# Patient Record
Sex: MALE | Race: WHITE | NOT HISPANIC OR LATINO | Employment: OTHER | URBAN - NONMETROPOLITAN AREA
[De-identification: names, ages, dates, MRNs, and addresses within clinical notes are randomized per-mention and may not be internally consistent; named-entity substitution may affect disease eponyms.]

---

## 2023-11-20 ENCOUNTER — OFFICE VISIT (OUTPATIENT)
Dept: FAMILY MEDICINE CLINIC | Facility: CLINIC | Age: 53
End: 2023-11-20
Payer: MEDICARE

## 2023-11-20 VITALS
SYSTOLIC BLOOD PRESSURE: 125 MMHG | HEART RATE: 93 BPM | WEIGHT: 173 LBS | BODY MASS INDEX: 25.62 KG/M2 | HEIGHT: 69 IN | OXYGEN SATURATION: 99 % | TEMPERATURE: 98 F | DIASTOLIC BLOOD PRESSURE: 85 MMHG

## 2023-11-20 DIAGNOSIS — Z23 NEED FOR INFLUENZA VACCINATION: ICD-10-CM

## 2023-11-20 DIAGNOSIS — Z12.2 ENCOUNTER FOR SCREENING FOR LUNG CANCER: ICD-10-CM

## 2023-11-20 DIAGNOSIS — Z11.59 NEED FOR HEPATITIS C SCREENING TEST: ICD-10-CM

## 2023-11-20 DIAGNOSIS — Z12.11 ENCOUNTER FOR SCREENING FOR MALIGNANT NEOPLASM OF COLON: ICD-10-CM

## 2023-11-20 DIAGNOSIS — R20.0 NUMBNESS OF LEFT FOOT: ICD-10-CM

## 2023-11-20 DIAGNOSIS — Z85.9 HISTORY OF CANCER: ICD-10-CM

## 2023-11-20 DIAGNOSIS — Z76.89 ESTABLISHING CARE WITH NEW DOCTOR, ENCOUNTER FOR: Primary | ICD-10-CM

## 2023-11-20 DIAGNOSIS — Z13.29 SCREENING FOR THYROID DISORDER: ICD-10-CM

## 2023-11-20 DIAGNOSIS — Z13.220 SCREENING, LIPID: ICD-10-CM

## 2023-11-20 DIAGNOSIS — F17.210 NICOTINE DEPENDENCE, CIGARETTES, UNCOMPLICATED: ICD-10-CM

## 2023-11-20 DIAGNOSIS — Z12.5 SCREENING FOR MALIGNANT NEOPLASM OF PROSTATE: ICD-10-CM

## 2023-11-20 NOTE — PROGRESS NOTES
"Chief Complaint   Patient presents with    Establish Care     Hasn't had a primary care provider in a long time. Wants a check up, flu shot, and blood pressure check.        Subjective     {Problem List  Visit Diagnosis   Encounters  Notes  Medications  Labs  Result Review Imaging  Media :23}     Kevin Reyes presents to [facility]    History of Present Illness    Past History:  Medical History: has a past medical history of Cancer and Hypertension.   Surgical History: has a past surgical history that includes Back surgery.   Family History: family history includes Cancer in his father; Diabetes in his maternal grandmother; Heart disease in his mother; Stroke in his maternal grandmother and maternal uncle.   Social History: reports that he has been smoking cigarettes. He started smoking about 35 years ago. He has a 35.00 pack-year smoking history. He has been exposed to tobacco smoke. He has never used smokeless tobacco. He reports that he does not drink alcohol and does not use drugs.  Allergies: Patient has no known allergies.  (Not in a hospital admission)       Social History     Socioeconomic History    Marital status:    Tobacco Use    Smoking status: Every Day     Packs/day: 1.00     Years: 35.00     Additional pack years: 0.00     Total pack years: 35.00     Types: Cigarettes     Start date: 1988     Passive exposure: Current    Smokeless tobacco: Never   Vaping Use    Vaping Use: Never used   Substance and Sexual Activity    Alcohol use: Never    Drug use: Never    Sexual activity: Defer       Health Maintenance Due   Topic Date Due    BMI FOLLOWUP  Never done    COLORECTAL CANCER SCREENING  Never done    HEPATITIS C SCREENING  Never done       Objective     Vital Signs:   /85 (BP Location: Left arm, Patient Position: Sitting)   Pulse 93   Temp 98 °F (36.7 °C) (Infrared)   Ht 174 cm (68.5\")   Wt 78.5 kg (173 lb)   SpO2 99%   BMI 25.92 " kg/m²       Physical Exam     Review of Systems     Result Review :{Labs  Result Review  Imaging  Med Tab  Media  Procedures :23}   {The following data was reviewed by (Optional):86984}  {Ambulatory Labs (Optional):27881}  {Data reviewed (Optional):10220:::1}          Assessment and Plan {CC Problem List  Visit Diagnosis   ROS  Review (Popup)  Health Maintenance  Quality  BestPractice  Medications  SmartSets  SnapShot Encounters  Media :23}   Diagnoses and all orders for this visit:    1. Establishing care with new doctor, encounter for (Primary)    2. Encounter for screening for malignant neoplasm of colon  -     Cologuard - Stool, Per Rectum; Future    3. Need for hepatitis C screening test  -     Hepatitis C Antibody; Future    4. Screening, lipid  -     Lipid panel; Future    5. Screening for thyroid disorder  -     TSH+Free T4; Future    6. History of cancer  -     CBC w AUTO Differential; Future  -     Comprehensive metabolic panel; Future    7. Need for influenza vaccination  -     Fluzone (or Fluarix & Flulaval for VFC) >6mos    8. Screening for malignant neoplasm of prostate  -     PSA SCREENING; Future    Discussed age appropriate preventative measures. Written information given. Patient verbalized understanding.  Preventative measures discussed: vaccines, wellness exams, routine screenings, routine lab work, mental health wellness, seatbelt safety, healthy diet.   {Time Spent (Optional):44321}    Pt thought to be clinically stable at this time.    Follow Up {Instructions Charge Capture  Follow-up Communications :23}  Return in about 3 months (around 2/20/2024), or if symptoms worsen or fail to improve.  Patient was given instructions and counseling regarding his condition or for health maintenance advice. Please see specific information pulled into the AVS if appropriate.

## 2023-11-21 NOTE — PROGRESS NOTES
"The ABCs of the Annual Wellness Visit  Subsequent Medicare Wellness Visit    Chief Complaint   Patient presents with    Establish Care     Hasn't had a primary care provider in a long time. Wants a check up, flu shot, and blood pressure check.       Subjective    History of Present Illness:  Kevin Reyes is a 53 y.o. male who presents for a Subsequent Medicare Wellness Visit.    The following portions of the patient's history were reviewed and   updated as appropriate: allergies, current medications, past family history, past medical history, past social history, past surgical history, and problem list.    Compared to one year ago, the patient feels his physical   health is the same.    Compared to one year ago, the patient feels his mental   health is the same.    Recent Hospitalizations:  He was not admitted to the hospital during the last year.       Current Medical Providers:  Patient Care Team:  Maria Luisa Schwartz APRN as PCP - General (Family Medicine)    No outpatient medications prior to visit.     No facility-administered medications prior to visit.       No opioid medication identified on active medication list. I have reviewed chart for other potential  high risk medication/s and harmful drug interactions in the elderly.        Aspirin is not on active medication list.  Aspirin use is not indicated based on review of current medical condition/s. Risk of harm outweighs potential benefits.  .    There is no problem list on file for this patient.    Advance Care Planning  Advance Directive is not on file.  ACP discussion was held with the patient during this visit. Patient does not have an advance directive, information provided.          Objective    Vitals:    11/20/23 1526   BP: 125/85   BP Location: Left arm   Patient Position: Sitting   Pulse: 93   Temp: 98 °F (36.7 °C)   TempSrc: Infrared   SpO2: 99%   Weight: 78.5 kg (173 lb)   Height: 174 cm (68.5\")     Estimated body mass index is 25.92 " "kg/m² as calculated from the following:    Height as of this encounter: 174 cm (68.5\").    Weight as of this encounter: 78.5 kg (173 lb).           Does the patient have evidence of cognitive impairment? No    Physical Exam  Vitals reviewed.   Constitutional:       Appearance: Normal appearance.   HENT:      Nose: Nose normal.      Mouth/Throat:      Mouth: Mucous membranes are moist.   Cardiovascular:      Rate and Rhythm: Normal rate and regular rhythm.      Pulses: Normal pulses.      Heart sounds: Normal heart sounds.   Pulmonary:      Effort: Pulmonary effort is normal.      Breath sounds: Normal breath sounds.   Skin:     General: Skin is warm and dry.   Neurological:      General: No focal deficit present.      Mental Status: He is alert and oriented to person, place, and time.   Psychiatric:         Mood and Affect: Mood normal.         Behavior: Behavior normal.                 HEALTH RISK ASSESSMENT    Smoking Status:  Social History     Tobacco Use   Smoking Status Every Day    Packs/day: 1.00    Years: 35.00    Additional pack years: 0.00    Total pack years: 35.00    Types: Cigarettes    Start date:     Passive exposure: Current   Smokeless Tobacco Never     Alcohol Consumption:  Social History     Substance and Sexual Activity   Alcohol Use Never     Fall Risk Screen:    STEADI Fall Risk Assessment has not been completed.    Depression Screenin/20/2023     3:25 PM   PHQ-2/PHQ-9 Depression Screening   Little Interest or Pleasure in Doing Things 0-->not at all   Feeling Down, Depressed or Hopeless 0-->not at all   PHQ-9: Brief Depression Severity Measure Score 0       Health Habits and Functional and Cognitive Screening:       No data to display                Age-appropriate Screening Schedule:  Refer to the list below for future screening recommendations based on patient's age, sex and/or medical conditions. Orders for these recommended tests are listed in the plan section. The patient " has been provided with a written plan.    Health Maintenance   Topic Date Due    COLORECTAL CANCER SCREENING  Never done    LUNG CANCER SCREENING  Never done    HEPATITIS C SCREENING  Never done    ANNUAL WELLNESS VISIT  Never done    COVID-19 Vaccine (1) 12/04/2023 (Originally 1970)    TDAP/TD VACCINES (1 - Tdap) 12/04/2023 (Originally 6/19/1989)    ZOSTER VACCINE (1 of 2) 12/04/2023 (Originally 6/19/2020)    BMI FOLLOWUP  11/20/2024    INFLUENZA VACCINE  Completed    Pneumococcal Vaccine 0-64  Discontinued              Assessment & Plan   CMS Preventative Services Quick Reference  Risk Factors Identified During Encounter  Fall Risk-High or Moderate: Discussed Fall Prevention in the home  Immunizations Discussed/Encouraged: Influenza, Pneumococcal 23, and COVID19  Tobacco Use/Dependance Risk (use dotphrase .tobaccocessation for documentation)  The above risks/problems have been discussed with the patient.  Follow up actions/plans if indicated are seen below in the Assessment/Plan Section.  Pertinent information has been shared with the patient in the After Visit Summary.    Diagnoses and all orders for this visit:    1. Establishing care with new doctor, encounter for (Primary)    2. Encounter for screening for malignant neoplasm of colon  -     Cologuard - Stool, Per Rectum; Future    3. Need for hepatitis C screening test  -     Hepatitis C Antibody; Future    4. Screening, lipid  -     Lipid panel; Future    5. Screening for thyroid disorder  -     TSH+Free T4; Future    6. History of cancer  -     CBC w AUTO Differential; Future  -     Comprehensive metabolic panel; Future    7. Need for influenza vaccination  -     Fluzone (or Fluarix & Flulaval for VFC) >6mos    8. Screening for malignant neoplasm of prostate  -     PSA SCREENING; Future    9. Encounter for screening for lung cancer  -      CT Chest Low Dose Cancer Screening WO; Future    10. Nicotine dependence, cigarettes, uncomplicated  -      CT  Chest Low Dose Cancer Screening WO; Future    11. 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 Wheelchair bound.        Follow Up:   Return in about 3 months (around 2/20/2024), or if symptoms worsen or fail to improve.     An After Visit Summary and PPPS were made available to the patient.

## 2023-11-27 ENCOUNTER — LAB (OUTPATIENT)
Dept: LAB | Facility: HOSPITAL | Age: 53
End: 2023-11-27
Payer: MEDICARE

## 2023-11-27 DIAGNOSIS — Z85.9 HISTORY OF CANCER: ICD-10-CM

## 2023-11-27 DIAGNOSIS — Z12.5 SCREENING FOR MALIGNANT NEOPLASM OF PROSTATE: ICD-10-CM

## 2023-11-27 DIAGNOSIS — Z11.59 NEED FOR HEPATITIS C SCREENING TEST: ICD-10-CM

## 2023-11-27 DIAGNOSIS — Z13.220 SCREENING, LIPID: ICD-10-CM

## 2023-11-27 DIAGNOSIS — Z13.29 SCREENING FOR THYROID DISORDER: ICD-10-CM

## 2023-11-27 LAB
ALBUMIN SERPL-MCNC: 4.5 G/DL (ref 3.5–5.2)
ALBUMIN/GLOB SERPL: 1.3 G/DL
ALP SERPL-CCNC: 128 U/L (ref 39–117)
ALT SERPL W P-5'-P-CCNC: 24 U/L (ref 1–41)
ANION GAP SERPL CALCULATED.3IONS-SCNC: 12.9 MMOL/L (ref 5–15)
AST SERPL-CCNC: 18 U/L (ref 1–40)
BASOPHILS # BLD AUTO: 0.14 10*3/MM3 (ref 0–0.2)
BASOPHILS NFR BLD AUTO: 1.1 % (ref 0–1.5)
BILIRUB SERPL-MCNC: 0.8 MG/DL (ref 0–1.2)
BUN SERPL-MCNC: 12 MG/DL (ref 6–20)
BUN/CREAT SERPL: 12.2 (ref 7–25)
CALCIUM SPEC-SCNC: 9.8 MG/DL (ref 8.6–10.5)
CHLORIDE SERPL-SCNC: 102 MMOL/L (ref 98–107)
CHOLEST SERPL-MCNC: 199 MG/DL (ref 0–200)
CO2 SERPL-SCNC: 25.1 MMOL/L (ref 22–29)
CREAT SERPL-MCNC: 0.98 MG/DL (ref 0.76–1.27)
DEPRECATED RDW RBC AUTO: 39 FL (ref 37–54)
EGFRCR SERPLBLD CKD-EPI 2021: 92.2 ML/MIN/1.73
EOSINOPHIL # BLD AUTO: 0.61 10*3/MM3 (ref 0–0.4)
EOSINOPHIL NFR BLD AUTO: 4.9 % (ref 0.3–6.2)
ERYTHROCYTE [DISTWIDTH] IN BLOOD BY AUTOMATED COUNT: 12.7 % (ref 12.3–15.4)
GLOBULIN UR ELPH-MCNC: 3.6 GM/DL
GLUCOSE SERPL-MCNC: 88 MG/DL (ref 65–99)
HCT VFR BLD AUTO: 47.9 % (ref 37.5–51)
HCV AB SER DONR QL: NORMAL
HDLC SERPL-MCNC: 25 MG/DL (ref 40–60)
HGB BLD-MCNC: 17.2 G/DL (ref 13–17.7)
IMM GRANULOCYTES # BLD AUTO: 0.04 10*3/MM3 (ref 0–0.05)
IMM GRANULOCYTES NFR BLD AUTO: 0.3 % (ref 0–0.5)
LDLC SERPL CALC-MCNC: 121 MG/DL (ref 0–100)
LDLC/HDLC SERPL: 4.58 {RATIO}
LYMPHOCYTES # BLD AUTO: 3.43 10*3/MM3 (ref 0.7–3.1)
LYMPHOCYTES NFR BLD AUTO: 27.4 % (ref 19.6–45.3)
MCH RBC QN AUTO: 31.1 PG (ref 26.6–33)
MCHC RBC AUTO-ENTMCNC: 35.9 G/DL (ref 31.5–35.7)
MCV RBC AUTO: 86.6 FL (ref 79–97)
MONOCYTES # BLD AUTO: 0.76 10*3/MM3 (ref 0.1–0.9)
MONOCYTES NFR BLD AUTO: 6.1 % (ref 5–12)
NEUTROPHILS NFR BLD AUTO: 60.2 % (ref 42.7–76)
NEUTROPHILS NFR BLD AUTO: 7.54 10*3/MM3 (ref 1.7–7)
NRBC BLD AUTO-RTO: 0 /100 WBC (ref 0–0.2)
PLATELET # BLD AUTO: 340 10*3/MM3 (ref 140–450)
PMV BLD AUTO: 9.6 FL (ref 6–12)
POTASSIUM SERPL-SCNC: 4.1 MMOL/L (ref 3.5–5.2)
PROT SERPL-MCNC: 8.1 G/DL (ref 6–8.5)
PSA SERPL-MCNC: 0.57 NG/ML (ref 0–4)
RBC # BLD AUTO: 5.53 10*6/MM3 (ref 4.14–5.8)
SODIUM SERPL-SCNC: 140 MMOL/L (ref 136–145)
T4 FREE SERPL-MCNC: 0.8 NG/DL (ref 0.93–1.7)
TRIGL SERPL-MCNC: 298 MG/DL (ref 0–150)
TSH SERPL DL<=0.05 MIU/L-ACNC: 5.04 UIU/ML (ref 0.27–4.2)
VLDLC SERPL-MCNC: 53 MG/DL (ref 5–40)
WBC NRBC COR # BLD AUTO: 12.52 10*3/MM3 (ref 3.4–10.8)

## 2023-11-27 PROCEDURE — 84443 ASSAY THYROID STIM HORMONE: CPT

## 2023-11-27 PROCEDURE — 36415 COLL VENOUS BLD VENIPUNCTURE: CPT

## 2023-11-27 PROCEDURE — 84439 ASSAY OF FREE THYROXINE: CPT

## 2023-11-27 PROCEDURE — 85025 COMPLETE CBC W/AUTO DIFF WBC: CPT

## 2023-11-27 PROCEDURE — 80053 COMPREHEN METABOLIC PANEL: CPT

## 2023-11-27 PROCEDURE — 86803 HEPATITIS C AB TEST: CPT

## 2023-11-27 PROCEDURE — 80061 LIPID PANEL: CPT

## 2023-11-27 PROCEDURE — G0103 PSA SCREENING: HCPCS

## 2023-11-29 DIAGNOSIS — E03.9 HYPOTHYROIDISM, UNSPECIFIED TYPE: Primary | ICD-10-CM

## 2023-11-29 RX ORDER — LEVOTHYROXINE SODIUM 0.03 MG/1
25 TABLET ORAL
Qty: 30 TABLET | Refills: 2 | Status: SHIPPED | OUTPATIENT
Start: 2023-11-29

## 2023-11-29 NOTE — PROGRESS NOTES
TSH is elevated at 5.040. Free T4 is low. This is hypothyroidism. Will start him on levothyroxine at this time. LDL and triglycerides are high. I would change diet to try to bring those down.

## 2024-02-16 ENCOUNTER — TELEPHONE (OUTPATIENT)
Dept: FAMILY MEDICINE CLINIC | Facility: CLINIC | Age: 54
End: 2024-02-16
Payer: MEDICARE

## 2025-02-15 PROCEDURE — 99285 EMERGENCY DEPT VISIT HI MDM: CPT

## 2025-02-16 ENCOUNTER — APPOINTMENT (OUTPATIENT)
Dept: CT IMAGING | Facility: HOSPITAL | Age: 55
DRG: 871 | End: 2025-02-16
Payer: MEDICARE

## 2025-02-16 ENCOUNTER — APPOINTMENT (OUTPATIENT)
Dept: ULTRASOUND IMAGING | Facility: HOSPITAL | Age: 55
DRG: 871 | End: 2025-02-16
Payer: MEDICARE

## 2025-02-16 ENCOUNTER — HOSPITAL ENCOUNTER (INPATIENT)
Facility: HOSPITAL | Age: 55
LOS: 4 days | Discharge: HOME | DRG: 871 | End: 2025-02-20
Attending: EMERGENCY MEDICINE | Admitting: STUDENT IN AN ORGANIZED HEALTH CARE EDUCATION/TRAINING PROGRAM
Payer: MEDICARE

## 2025-02-16 DIAGNOSIS — Z78.9 DECREASED ACTIVITIES OF DAILY LIVING (ADL): ICD-10-CM

## 2025-02-16 DIAGNOSIS — K85.00 IDIOPATHIC ACUTE PANCREATITIS, UNSPECIFIED COMPLICATION STATUS: Primary | ICD-10-CM

## 2025-02-16 DIAGNOSIS — R26.2 DIFFICULTY WALKING: ICD-10-CM

## 2025-02-16 DIAGNOSIS — R10.10 PAIN OF UPPER ABDOMEN: ICD-10-CM

## 2025-02-16 PROBLEM — E87.20 LACTIC ACIDOSIS: Status: ACTIVE | Noted: 2025-02-16

## 2025-02-16 PROBLEM — D72.829 LEUKOCYTOSIS: Status: ACTIVE | Noted: 2025-02-16

## 2025-02-16 PROBLEM — K85.90 PANCREATITIS: Status: ACTIVE | Noted: 2025-02-16

## 2025-02-16 PROBLEM — R74.01 TRANSAMINITIS: Status: ACTIVE | Noted: 2025-02-16

## 2025-02-16 LAB
ALBUMIN SERPL-MCNC: 4.2 G/DL (ref 3.5–5.2)
ALBUMIN/GLOB SERPL: 1 G/DL
ALP SERPL-CCNC: 159 U/L (ref 39–117)
ALT SERPL W P-5'-P-CCNC: 204 U/L (ref 1–41)
ANION GAP SERPL CALCULATED.3IONS-SCNC: 11.7 MMOL/L (ref 5–15)
ANION GAP SERPL CALCULATED.3IONS-SCNC: 13.5 MMOL/L (ref 5–15)
AST SERPL-CCNC: 152 U/L (ref 1–40)
BACTERIA UR QL AUTO: ABNORMAL /HPF
BACTERIA UR QL AUTO: ABNORMAL /HPF
BASOPHILS # BLD AUTO: 0.04 10*3/MM3 (ref 0–0.2)
BASOPHILS # BLD AUTO: 0.09 10*3/MM3 (ref 0–0.2)
BASOPHILS NFR BLD AUTO: 0.2 % (ref 0–1.5)
BASOPHILS NFR BLD AUTO: 0.5 % (ref 0–1.5)
BILIRUB SERPL-MCNC: 1 MG/DL (ref 0–1.2)
BILIRUB UR QL STRIP: NEGATIVE
BILIRUB UR QL STRIP: NEGATIVE
BUN SERPL-MCNC: 17 MG/DL (ref 6–20)
BUN SERPL-MCNC: 19 MG/DL (ref 6–20)
BUN/CREAT SERPL: 16.8 (ref 7–25)
BUN/CREAT SERPL: 19 (ref 7–25)
CA-I BLDA-SCNC: 0.97 MMOL/L (ref 1.13–1.32)
CALCIUM SPEC-SCNC: 9.1 MG/DL (ref 8.6–10.5)
CALCIUM SPEC-SCNC: 9.8 MG/DL (ref 8.6–10.5)
CHLORIDE SERPL-SCNC: 100 MMOL/L (ref 98–107)
CHLORIDE SERPL-SCNC: 102 MMOL/L (ref 98–107)
CLARITY UR: ABNORMAL
CLARITY UR: ABNORMAL
CO2 SERPL-SCNC: 23.5 MMOL/L (ref 22–29)
CO2 SERPL-SCNC: 24.3 MMOL/L (ref 22–29)
COLOR UR: ABNORMAL
COLOR UR: YELLOW
CREAT SERPL-MCNC: 1 MG/DL (ref 0.76–1.27)
CREAT SERPL-MCNC: 1.01 MG/DL (ref 0.76–1.27)
D-LACTATE SERPL-SCNC: 1.9 MMOL/L (ref 0.5–2)
D-LACTATE SERPL-SCNC: 2.2 MMOL/L (ref 0.5–2)
D-LACTATE SERPL-SCNC: 3 MMOL/L (ref 0.5–2)
D-LACTATE SERPL-SCNC: 3.5 MMOL/L (ref 0.5–2)
D-LACTATE SERPL-SCNC: 3.7 MMOL/L (ref 0.5–2)
D-LACTATE SERPL-SCNC: 4.1 MMOL/L (ref 0.5–2)
DEPRECATED RDW RBC AUTO: 43.9 FL (ref 37–54)
DEPRECATED RDW RBC AUTO: 44.7 FL (ref 37–54)
EGFRCR SERPLBLD CKD-EPI 2021: 88.4 ML/MIN/1.73
EGFRCR SERPLBLD CKD-EPI 2021: 89.4 ML/MIN/1.73
EOSINOPHIL # BLD AUTO: 0.01 10*3/MM3 (ref 0–0.4)
EOSINOPHIL # BLD AUTO: 0.02 10*3/MM3 (ref 0–0.4)
EOSINOPHIL NFR BLD AUTO: 0 % (ref 0.3–6.2)
EOSINOPHIL NFR BLD AUTO: 0.1 % (ref 0.3–6.2)
ERYTHROCYTE [DISTWIDTH] IN BLOOD BY AUTOMATED COUNT: 13.4 % (ref 12.3–15.4)
ERYTHROCYTE [DISTWIDTH] IN BLOOD BY AUTOMATED COUNT: 13.6 % (ref 12.3–15.4)
ETHANOL BLD-MCNC: <10 MG/DL (ref 0–10)
ETHANOL UR QL: <0.01 %
GLOBULIN UR ELPH-MCNC: 4.2 GM/DL
GLUCOSE BLDC GLUCOMTR-MCNC: 143 MG/DL (ref 70–99)
GLUCOSE BLDC GLUCOMTR-MCNC: 149 MG/DL (ref 70–99)
GLUCOSE SERPL-MCNC: 148 MG/DL (ref 65–99)
GLUCOSE SERPL-MCNC: 199 MG/DL (ref 65–99)
GLUCOSE UR STRIP-MCNC: ABNORMAL MG/DL
GLUCOSE UR STRIP-MCNC: ABNORMAL MG/DL
HCT VFR BLD AUTO: 52.6 % (ref 37.5–51)
HCT VFR BLD AUTO: 56 % (ref 37.5–51)
HGB BLD-MCNC: 17.5 G/DL (ref 13–17.7)
HGB BLD-MCNC: 18.3 G/DL (ref 13–17.7)
HGB UR QL STRIP.AUTO: ABNORMAL
HGB UR QL STRIP.AUTO: ABNORMAL
HOLD SPECIMEN: NORMAL
HOLD SPECIMEN: NORMAL
HYALINE CASTS UR QL AUTO: ABNORMAL /LPF
HYALINE CASTS UR QL AUTO: ABNORMAL /LPF
IMM GRANULOCYTES # BLD AUTO: 0.07 10*3/MM3 (ref 0–0.05)
IMM GRANULOCYTES # BLD AUTO: 0.09 10*3/MM3 (ref 0–0.05)
IMM GRANULOCYTES NFR BLD AUTO: 0.4 % (ref 0–0.5)
IMM GRANULOCYTES NFR BLD AUTO: 0.4 % (ref 0–0.5)
KETONES UR QL STRIP: NEGATIVE
KETONES UR QL STRIP: NEGATIVE
LDH SERPL-CCNC: 255 U/L (ref 135–225)
LEUKOCYTE ESTERASE UR QL STRIP.AUTO: ABNORMAL
LEUKOCYTE ESTERASE UR QL STRIP.AUTO: ABNORMAL
LIPASE SERPL-CCNC: 1534 U/L (ref 13–60)
LYMPHOCYTES # BLD AUTO: 1.16 10*3/MM3 (ref 0.7–3.1)
LYMPHOCYTES # BLD AUTO: 1.26 10*3/MM3 (ref 0.7–3.1)
LYMPHOCYTES NFR BLD AUTO: 5.9 % (ref 19.6–45.3)
LYMPHOCYTES NFR BLD AUTO: 6.7 % (ref 19.6–45.3)
MCH RBC QN AUTO: 29.4 PG (ref 26.6–33)
MCH RBC QN AUTO: 29.7 PG (ref 26.6–33)
MCHC RBC AUTO-ENTMCNC: 32.7 G/DL (ref 31.5–35.7)
MCHC RBC AUTO-ENTMCNC: 33.3 G/DL (ref 31.5–35.7)
MCV RBC AUTO: 89.3 FL (ref 79–97)
MCV RBC AUTO: 90 FL (ref 79–97)
MONOCYTES # BLD AUTO: 0.76 10*3/MM3 (ref 0.1–0.9)
MONOCYTES # BLD AUTO: 1.19 10*3/MM3 (ref 0.1–0.9)
MONOCYTES NFR BLD AUTO: 4.4 % (ref 5–12)
MONOCYTES NFR BLD AUTO: 5.6 % (ref 5–12)
NEUTROPHILS NFR BLD AUTO: 15.24 10*3/MM3 (ref 1.7–7)
NEUTROPHILS NFR BLD AUTO: 18.59 10*3/MM3 (ref 1.7–7)
NEUTROPHILS NFR BLD AUTO: 87.9 % (ref 42.7–76)
NEUTROPHILS NFR BLD AUTO: 87.9 % (ref 42.7–76)
NITRITE UR QL STRIP: NEGATIVE
NITRITE UR QL STRIP: POSITIVE
NRBC BLD AUTO-RTO: 0 /100 WBC (ref 0–0.2)
NRBC BLD AUTO-RTO: 0 /100 WBC (ref 0–0.2)
PH UR STRIP.AUTO: 7 [PH] (ref 5–8)
PH UR STRIP.AUTO: 7 [PH] (ref 5–8)
PLATELET # BLD AUTO: 290 10*3/MM3 (ref 140–450)
PLATELET # BLD AUTO: 300 10*3/MM3 (ref 140–450)
PMV BLD AUTO: 9.3 FL (ref 6–12)
PMV BLD AUTO: 9.3 FL (ref 6–12)
POTASSIUM SERPL-SCNC: 4.5 MMOL/L (ref 3.5–5.2)
POTASSIUM SERPL-SCNC: 4.6 MMOL/L (ref 3.5–5.2)
PROCALCITONIN SERPL-MCNC: 0.32 NG/ML (ref 0–0.25)
PROT SERPL-MCNC: 8.4 G/DL (ref 6–8.5)
PROT UR QL STRIP: ABNORMAL
PROT UR QL STRIP: ABNORMAL
RBC # BLD AUTO: 5.89 10*6/MM3 (ref 4.14–5.8)
RBC # BLD AUTO: 6.22 10*6/MM3 (ref 4.14–5.8)
RBC # UR STRIP: ABNORMAL /HPF
RBC # UR STRIP: ABNORMAL /HPF
REF LAB TEST METHOD: ABNORMAL
REF LAB TEST METHOD: ABNORMAL
SODIUM SERPL-SCNC: 137 MMOL/L (ref 136–145)
SODIUM SERPL-SCNC: 138 MMOL/L (ref 136–145)
SP GR UR STRIP: 1.02 (ref 1–1.03)
SP GR UR STRIP: >1.03 (ref 1–1.03)
SQUAMOUS #/AREA URNS HPF: ABNORMAL /HPF
SQUAMOUS #/AREA URNS HPF: ABNORMAL /HPF
TRIGL SERPL-MCNC: 241 MG/DL (ref 0–150)
UROBILINOGEN UR QL STRIP: ABNORMAL
UROBILINOGEN UR QL STRIP: ABNORMAL
WBC # UR STRIP: ABNORMAL /HPF
WBC # UR STRIP: ABNORMAL /HPF
WBC NRBC COR # BLD AUTO: 17.34 10*3/MM3 (ref 3.4–10.8)
WBC NRBC COR # BLD AUTO: 21.18 10*3/MM3 (ref 3.4–10.8)
WHOLE BLOOD HOLD COAG: NORMAL
WHOLE BLOOD HOLD SPECIMEN: NORMAL

## 2025-02-16 PROCEDURE — 25010000002 HEPARIN (PORCINE) PER 1000 UNITS: Performed by: STUDENT IN AN ORGANIZED HEALTH CARE EDUCATION/TRAINING PROGRAM

## 2025-02-16 PROCEDURE — 83615 LACTATE (LD) (LDH) ENZYME: CPT | Performed by: NURSE PRACTITIONER

## 2025-02-16 PROCEDURE — 25810000003 LACTATED RINGERS PER 1000 ML: Performed by: STUDENT IN AN ORGANIZED HEALTH CARE EDUCATION/TRAINING PROGRAM

## 2025-02-16 PROCEDURE — 80053 COMPREHEN METABOLIC PANEL: CPT | Performed by: EMERGENCY MEDICINE

## 2025-02-16 PROCEDURE — 85025 COMPLETE CBC W/AUTO DIFF WBC: CPT | Performed by: EMERGENCY MEDICINE

## 2025-02-16 PROCEDURE — 87040 BLOOD CULTURE FOR BACTERIA: CPT | Performed by: NURSE PRACTITIONER

## 2025-02-16 PROCEDURE — 85025 COMPLETE CBC W/AUTO DIFF WBC: CPT | Performed by: STUDENT IN AN ORGANIZED HEALTH CARE EDUCATION/TRAINING PROGRAM

## 2025-02-16 PROCEDURE — 82330 ASSAY OF CALCIUM: CPT

## 2025-02-16 PROCEDURE — 99223 1ST HOSP IP/OBS HIGH 75: CPT | Performed by: STUDENT IN AN ORGANIZED HEALTH CARE EDUCATION/TRAINING PROGRAM

## 2025-02-16 PROCEDURE — 84145 PROCALCITONIN (PCT): CPT | Performed by: NURSE PRACTITIONER

## 2025-02-16 PROCEDURE — 82787 IGG 1 2 3 OR 4 EACH: CPT | Performed by: INTERNAL MEDICINE

## 2025-02-16 PROCEDURE — 25810000003 SODIUM CHLORIDE 0.9 % SOLUTION: Performed by: STUDENT IN AN ORGANIZED HEALTH CARE EDUCATION/TRAINING PROGRAM

## 2025-02-16 PROCEDURE — 76700 US EXAM ABDOM COMPLETE: CPT

## 2025-02-16 PROCEDURE — 25010000002 MORPHINE PER 10 MG: Performed by: STUDENT IN AN ORGANIZED HEALTH CARE EDUCATION/TRAINING PROGRAM

## 2025-02-16 PROCEDURE — 25010000002 ONDANSETRON PER 1 MG: Performed by: EMERGENCY MEDICINE

## 2025-02-16 PROCEDURE — 99291 CRITICAL CARE FIRST HOUR: CPT | Performed by: INTERNAL MEDICINE

## 2025-02-16 PROCEDURE — 87086 URINE CULTURE/COLONY COUNT: CPT | Performed by: NURSE PRACTITIONER

## 2025-02-16 PROCEDURE — 81001 URINALYSIS AUTO W/SCOPE: CPT | Performed by: NURSE PRACTITIONER

## 2025-02-16 PROCEDURE — 25810000003 LACTATED RINGERS SOLUTION: Performed by: NURSE PRACTITIONER

## 2025-02-16 PROCEDURE — 99221 1ST HOSP IP/OBS SF/LOW 40: CPT | Performed by: INTERNAL MEDICINE

## 2025-02-16 PROCEDURE — 82948 REAGENT STRIP/BLOOD GLUCOSE: CPT | Performed by: NURSE PRACTITIONER

## 2025-02-16 PROCEDURE — 25510000001 IOPAMIDOL PER 1 ML: Performed by: EMERGENCY MEDICINE

## 2025-02-16 PROCEDURE — 87077 CULTURE AEROBIC IDENTIFY: CPT | Performed by: NURSE PRACTITIONER

## 2025-02-16 PROCEDURE — 25010000002 CEFTRIAXONE PER 250 MG: Performed by: NURSE PRACTITIONER

## 2025-02-16 PROCEDURE — 36415 COLL VENOUS BLD VENIPUNCTURE: CPT | Performed by: EMERGENCY MEDICINE

## 2025-02-16 PROCEDURE — 82948 REAGENT STRIP/BLOOD GLUCOSE: CPT

## 2025-02-16 PROCEDURE — 87186 SC STD MICRODIL/AGAR DIL: CPT | Performed by: NURSE PRACTITIONER

## 2025-02-16 PROCEDURE — 25010000002 MORPHINE PER 10 MG: Performed by: EMERGENCY MEDICINE

## 2025-02-16 PROCEDURE — 83605 ASSAY OF LACTIC ACID: CPT | Performed by: EMERGENCY MEDICINE

## 2025-02-16 PROCEDURE — 81001 URINALYSIS AUTO W/SCOPE: CPT | Performed by: EMERGENCY MEDICINE

## 2025-02-16 PROCEDURE — 83690 ASSAY OF LIPASE: CPT | Performed by: EMERGENCY MEDICINE

## 2025-02-16 PROCEDURE — 82077 ASSAY SPEC XCP UR&BREATH IA: CPT | Performed by: EMERGENCY MEDICINE

## 2025-02-16 PROCEDURE — 25810000003 LACTATED RINGERS SOLUTION: Performed by: EMERGENCY MEDICINE

## 2025-02-16 PROCEDURE — 74177 CT ABD & PELVIS W/CONTRAST: CPT

## 2025-02-16 PROCEDURE — 25010000002 ONDANSETRON PER 1 MG: Performed by: STUDENT IN AN ORGANIZED HEALTH CARE EDUCATION/TRAINING PROGRAM

## 2025-02-16 PROCEDURE — 84478 ASSAY OF TRIGLYCERIDES: CPT | Performed by: EMERGENCY MEDICINE

## 2025-02-16 RX ORDER — DEXTROSE MONOHYDRATE 25 G/50ML
25 INJECTION, SOLUTION INTRAVENOUS
Status: DISCONTINUED | OUTPATIENT
Start: 2025-02-16 | End: 2025-02-20 | Stop reason: HOSPADM

## 2025-02-16 RX ORDER — SODIUM CHLORIDE 0.9 % (FLUSH) 0.9 %
10 SYRINGE (ML) INJECTION AS NEEDED
Status: DISCONTINUED | OUTPATIENT
Start: 2025-02-16 | End: 2025-02-19

## 2025-02-16 RX ORDER — ASPIRIN 81 MG/1
81 TABLET ORAL DAILY
COMMUNITY
End: 2025-02-26

## 2025-02-16 RX ORDER — IOPAMIDOL 755 MG/ML
100 INJECTION, SOLUTION INTRAVASCULAR
Status: COMPLETED | OUTPATIENT
Start: 2025-02-16 | End: 2025-02-16

## 2025-02-16 RX ORDER — BISACODYL 5 MG/1
5 TABLET, DELAYED RELEASE ORAL DAILY PRN
Status: DISCONTINUED | OUTPATIENT
Start: 2025-02-16 | End: 2025-02-18

## 2025-02-16 RX ORDER — ONDANSETRON 4 MG/1
4 TABLET, ORALLY DISINTEGRATING ORAL EVERY 6 HOURS PRN
Status: DISCONTINUED | OUTPATIENT
Start: 2025-02-16 | End: 2025-02-20 | Stop reason: HOSPADM

## 2025-02-16 RX ORDER — NITROGLYCERIN 0.4 MG/1
0.4 TABLET SUBLINGUAL
Status: DISCONTINUED | OUTPATIENT
Start: 2025-02-16 | End: 2025-02-20 | Stop reason: HOSPADM

## 2025-02-16 RX ORDER — NALOXONE HCL 0.4 MG/ML
0.4 VIAL (ML) INJECTION
Status: DISCONTINUED | OUTPATIENT
Start: 2025-02-16 | End: 2025-02-20 | Stop reason: HOSPADM

## 2025-02-16 RX ORDER — ONDANSETRON 2 MG/ML
4 INJECTION INTRAMUSCULAR; INTRAVENOUS ONCE
Status: COMPLETED | OUTPATIENT
Start: 2025-02-16 | End: 2025-02-16

## 2025-02-16 RX ORDER — SODIUM CHLORIDE 9 MG/ML
200 INJECTION, SOLUTION INTRAVENOUS CONTINUOUS
Status: DISCONTINUED | OUTPATIENT
Start: 2025-02-16 | End: 2025-02-16

## 2025-02-16 RX ORDER — AMOXICILLIN 250 MG
2 CAPSULE ORAL 2 TIMES DAILY PRN
Status: DISCONTINUED | OUTPATIENT
Start: 2025-02-16 | End: 2025-02-18

## 2025-02-16 RX ORDER — BISACODYL 10 MG
10 SUPPOSITORY, RECTAL RECTAL DAILY PRN
Status: DISCONTINUED | OUTPATIENT
Start: 2025-02-16 | End: 2025-02-18

## 2025-02-16 RX ORDER — HEPARIN SODIUM 5000 [USP'U]/ML
5000 INJECTION, SOLUTION INTRAVENOUS; SUBCUTANEOUS EVERY 12 HOURS SCHEDULED
Status: DISCONTINUED | OUTPATIENT
Start: 2025-02-16 | End: 2025-02-20 | Stop reason: HOSPADM

## 2025-02-16 RX ORDER — MORPHINE SULFATE 2 MG/ML
2 INJECTION, SOLUTION INTRAMUSCULAR; INTRAVENOUS EVERY 4 HOURS PRN
Status: DISCONTINUED | OUTPATIENT
Start: 2025-02-16 | End: 2025-02-19

## 2025-02-16 RX ORDER — TAMSULOSIN HYDROCHLORIDE 0.4 MG/1
0.4 CAPSULE ORAL DAILY
Status: DISCONTINUED | OUTPATIENT
Start: 2025-02-16 | End: 2025-02-20 | Stop reason: HOSPADM

## 2025-02-16 RX ORDER — SODIUM CHLORIDE 9 MG/ML
40 INJECTION, SOLUTION INTRAVENOUS AS NEEDED
Status: DISCONTINUED | OUTPATIENT
Start: 2025-02-16 | End: 2025-02-20 | Stop reason: HOSPADM

## 2025-02-16 RX ORDER — POLYETHYLENE GLYCOL 3350 17 G/17G
17 POWDER, FOR SOLUTION ORAL DAILY PRN
Status: DISCONTINUED | OUTPATIENT
Start: 2025-02-16 | End: 2025-02-18

## 2025-02-16 RX ORDER — NICOTINE POLACRILEX 4 MG
15 LOZENGE BUCCAL
Status: DISCONTINUED | OUTPATIENT
Start: 2025-02-16 | End: 2025-02-20 | Stop reason: HOSPADM

## 2025-02-16 RX ORDER — SODIUM CHLORIDE 0.9 % (FLUSH) 0.9 %
10 SYRINGE (ML) INJECTION AS NEEDED
Status: DISCONTINUED | OUTPATIENT
Start: 2025-02-16 | End: 2025-02-20 | Stop reason: HOSPADM

## 2025-02-16 RX ORDER — ONDANSETRON 2 MG/ML
4 INJECTION INTRAMUSCULAR; INTRAVENOUS EVERY 6 HOURS PRN
Status: DISCONTINUED | OUTPATIENT
Start: 2025-02-16 | End: 2025-02-20 | Stop reason: HOSPADM

## 2025-02-16 RX ORDER — ACETAMINOPHEN 325 MG/1
650 TABLET ORAL EVERY 6 HOURS PRN
Status: DISCONTINUED | OUTPATIENT
Start: 2025-02-16 | End: 2025-02-20 | Stop reason: HOSPADM

## 2025-02-16 RX ORDER — IBUPROFEN 600 MG/1
1 TABLET ORAL
Status: DISCONTINUED | OUTPATIENT
Start: 2025-02-16 | End: 2025-02-20 | Stop reason: HOSPADM

## 2025-02-16 RX ORDER — SODIUM CHLORIDE, SODIUM LACTATE, POTASSIUM CHLORIDE, CALCIUM CHLORIDE 600; 310; 30; 20 MG/100ML; MG/100ML; MG/100ML; MG/100ML
250 INJECTION, SOLUTION INTRAVENOUS CONTINUOUS
Status: DISCONTINUED | OUTPATIENT
Start: 2025-02-16 | End: 2025-02-18

## 2025-02-16 RX ORDER — SODIUM CHLORIDE 0.9 % (FLUSH) 0.9 %
10 SYRINGE (ML) INJECTION EVERY 12 HOURS SCHEDULED
Status: DISCONTINUED | OUTPATIENT
Start: 2025-02-16 | End: 2025-02-20 | Stop reason: HOSPADM

## 2025-02-16 RX ADMIN — TAMSULOSIN HYDROCHLORIDE 0.4 MG: 0.4 CAPSULE ORAL at 17:15

## 2025-02-16 RX ADMIN — MUPIROCIN 1 APPLICATION: 20 OINTMENT TOPICAL at 20:26

## 2025-02-16 RX ADMIN — MORPHINE SULFATE 4 MG: 4 INJECTION, SOLUTION INTRAMUSCULAR; INTRAVENOUS at 08:29

## 2025-02-16 RX ADMIN — SODIUM CHLORIDE 150 ML/HR: 9 INJECTION, SOLUTION INTRAVENOUS at 08:29

## 2025-02-16 RX ADMIN — ONDANSETRON 4 MG: 2 INJECTION INTRAMUSCULAR; INTRAVENOUS at 02:35

## 2025-02-16 RX ADMIN — SODIUM CHLORIDE, SODIUM LACTATE, POTASSIUM CHLORIDE, CALCIUM CHLORIDE 250 ML/HR: 20; 30; 600; 310 INJECTION, SOLUTION INTRAVENOUS at 10:38

## 2025-02-16 RX ADMIN — SODIUM CHLORIDE, SODIUM LACTATE, POTASSIUM CHLORIDE, AND CALCIUM CHLORIDE 1000 ML: .6; .31; .03; .02 INJECTION, SOLUTION INTRAVENOUS at 12:53

## 2025-02-16 RX ADMIN — MORPHINE SULFATE 2 MG: 2 INJECTION, SOLUTION INTRAMUSCULAR; INTRAVENOUS at 20:46

## 2025-02-16 RX ADMIN — Medication 10 ML: at 20:26

## 2025-02-16 RX ADMIN — SODIUM CHLORIDE, SODIUM LACTATE, POTASSIUM CHLORIDE, CALCIUM CHLORIDE 250 ML/HR: 20; 30; 600; 310 INJECTION, SOLUTION INTRAVENOUS at 18:47

## 2025-02-16 RX ADMIN — MORPHINE SULFATE 4 MG: 4 INJECTION, SOLUTION INTRAMUSCULAR; INTRAVENOUS at 02:36

## 2025-02-16 RX ADMIN — SODIUM CHLORIDE, SODIUM LACTATE, POTASSIUM CHLORIDE, CALCIUM CHLORIDE 1000 ML: 20; 30; 600; 310 INJECTION, SOLUTION INTRAVENOUS at 02:37

## 2025-02-16 RX ADMIN — Medication 10 ML: at 08:32

## 2025-02-16 RX ADMIN — SODIUM CHLORIDE, SODIUM LACTATE, POTASSIUM CHLORIDE, CALCIUM CHLORIDE 250 ML/HR: 20; 30; 600; 310 INJECTION, SOLUTION INTRAVENOUS at 14:37

## 2025-02-16 RX ADMIN — HEPARIN SODIUM 5000 UNITS: 5000 INJECTION INTRAVENOUS; SUBCUTANEOUS at 20:26

## 2025-02-16 RX ADMIN — SODIUM CHLORIDE 2000 MG: 9 INJECTION INTRAMUSCULAR; INTRAVENOUS; SUBCUTANEOUS at 20:26

## 2025-02-16 RX ADMIN — IOPAMIDOL 100 ML: 755 INJECTION, SOLUTION INTRAVENOUS at 01:16

## 2025-02-16 RX ADMIN — ACETAMINOPHEN 650 MG: 325 TABLET ORAL at 14:32

## 2025-02-16 RX ADMIN — ONDANSETRON 4 MG: 2 INJECTION INTRAMUSCULAR; INTRAVENOUS at 20:46

## 2025-02-16 NOTE — PAYOR COMM NOTE
"UR DEPARTMENT    Marian Johnson RN  Phone [phone]  Fax [phone]    [address] [address]    NPI [id number]  TAX ID [id number]    PHYSICIAN NAME AND NPI FAROOQ CHIU  [id number]    BED TYPE INPATIENT CRITICAL CARE    TYPE OF ADMISSION: ED ADMISSION MEDICAL    ICD 10 CODE  K85.90    DATE OF ADMISSION 02/16/2025      Kevin Turner (54 y.o. Male)       Date of Birth   1970    Social Security Number       Address   [address] [address]    Home Phone       MRN   [id number]       Tenriism   None    Marital Status                               Admission Date   2/16/25    Admission Type   Emergency    Admitting Provider   Farooq Chiu MD    Attending Provider   Uriel Salas MD    Department, Room/Bed   [facility] CORONARY CARE UNIT, C06/1       Discharge Date       Discharge Disposition       Discharge Destination                                 Attending Provider: Uriel Salas MD    Allergies: No Known Allergies    Isolation: None   Infection: None   Code Status: CPR    Ht: 174 cm (68.5\")   Wt: 85 kg (187 lb 6.3 oz)    Admission Cmt: None   Principal Problem: Pancreatitis [K85.90]                   Active Insurance as of 2/16/2025       Primary Coverage       Payor Plan Insurance Group Employer/Plan Group    [id number] MEDICARE ADVANTAGE PASSPORT MED ADV SNP [id number]       Payor Plan Address Payor Plan Phone Number Payor Plan Fax Number Effective Dates    [address]   2/1/2025 - None Entered    [place] [id number]         Subscriber Name Subscriber Birth Date Member ID       KEVIN TURNER 1970 [id number]               Secondary Coverage       Payor Plan Insurance Group Employer/Plan Group    [id number] [facility] BY EVY GASTON BY EVY [id number]       Payor Plan Address Payor Plan Phone Number Payor Plan Fax Number Effective Dates    [address] [address]   1/1/2021 - None Entered    " [facility] [address]         Subscriber Name Subscriber Birth Date Member ID       EKTA TURNER 1970 [id number]                     Emergency Contacts        (Rel.) Home Phone Work Phone Mobile Phone    RYAN PATEL (Mother) -- -- [phone]             Pancreatitis [facility]       Indications Met   Last updated by Marian Johnson on 2/16/2025 1639     Review Status Created By   Primary Completed Marian Johnson      Criteria Review   Pancreatitis [facility]     Overall Determination: Indications Met     Criteria:  [×] Admission is indicated for  1 or more  of the following :      [×] Acute pancreatitis, [E] [F] as indicated by  2 or more  of the following :          [×] Abdominal pain              2/16/2025  4:39 PM                  -- 2/16/2025  4:39 PM by Marian Johnson --                      mid upper abd pain          [×] Serum lipase greater than 3 times the upper limit of normal, or urinary trypsinogen-2 greater than 50 ng/mL [B]              2/16/2025  4:39 PM                  -- 2/16/2025  4:39 PM by Marian Johnson --                      Lipase 1,534          [×] Findings on imaging indicative of acute pancreatitis (eg, pancreatic inflammation, pancreatic necrosis, peripancreatic fluid collection)              2/16/2025  4:39 PM                  -- 2/16/2025  4:39 PM by Marian Johnson --                      CT abd- Acute pancreatitis.     Notes:  -- 2/16/2025  4:39 PM by Marian Johnson --      Subject: Admission      54 y.o. male presents with abdominal pain that started in the morning. The pain is located in the mid upper abdomen. It has been worsening throughout the day. Pain does radiate to the back. He has associated nausea and dry heaves and 2 episodes of vomiting of nonbloody nonbilious emesis. He denies other symptoms. He denies any changes in bowel habits. He has not had any melena nor hematochezia nor diarrhea. Patient has not had significant right upper quadrant pain.  He does not have a history of high triglycerides. He does have a history of alcohol abuse however has not drank in over 2 years. CT abd- Acute pancreatitis.  Patient with worsening lactic acidosis. Worsening leukocytosis. GI consulted. Patient transferring to ICU. Received LR IV fluid bolus, Morphine IV and Zofran IV in ER.                   Assessment and Plan:            #Pancreatitis      #Lactic acidosis      #Leukocytosis      LR at 250 cc/h      GI consulted, appreciate recommendations      Transfer to ICU      IV morphine for pain control as needed      N.p.o.      MRCP                         Chen: CECILIO [id number] Tax ID [id number]     History & Physical        Receveur, MD Dioni at 02/16/25 3677              Patient Care Team:  Maria Luisa Schwartz APRN as PCP - General (Family Medicine)    Chief complaint abdominal pain    Subjective    Patient is a 54 y.o. male presents with abdominal pain that started in the morning.  The pain is located in the mid upper abdomen.  It has been worsening throughout the day.  Pain does radiate to the back.  He has associated nausea and dry heaves and 2 episodes of vomiting of nonbloody nonbilious emesis.  He denies other symptoms.  He denies any changes in bowel habits.  He has not had any melena nor hematochezia nor diarrhea.  Patient has not had significant right upper quadrant pain.  He does not have a history of high triglycerides.  He does have a history of alcohol abuse however has not drank in over 2 years.    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     Family History   Problem Relation Age of Onset   • Heart disease Mother    • Cancer Father    • Stroke Maternal Uncle    • Stroke Maternal Grandmother    • Diabetes Maternal Grandmother      Social History      Tobacco Use   • Smoking status: Every Day     Current packs/day: 1.00     Average packs/day: 1 pack/day for 37.1 years (37.1 ttl pk-yrs)     Types: Cigarettes     Start date: 1988     Passive exposure: Current   • Smokeless tobacco: Never   Vaping Use   • Vaping status: Never Used   Substance Use Topics   • Alcohol use: Never   • Drug use: Never     (Not in a hospital admission)   Allergies:  Patient has no known allergies.    Objective    Vital Signs  Temp:  [97.8 °F (36.6 °C)] 97.8 °F (36.6 °C)  Heart Rate:  [71-84] 71  Resp:  [24] 24  BP: (140-148)/(78-91) 148/78    Physical Exam:      General Appearance:  Alert, cooperative, in no acute distress   Head:  Normocephalic, without obvious abnormality, atraumatic   Eyes:  Lids and lashes normal, conjunctivae and sclerae normal, no icterus, no pallor, corneas clear, PERRLA   Ears:  Ears appear intact with no abnormalities noted   Throat:  No oral lesions, no thrush, oral mucosa moist   Neck:  No adenopathy, supple, trachea midline, no thyromegaly, no carotid bruit, no JVD   Back:  No kyphosis present, no scoliosis present, no skin lesions, erythema or scars, no tenderness to percussion or palpation, range of motion normal   Lungs:  Clear to auscultation, respirations regular, even and unlabored    Heart:  Regular rhythm and normal rate, normal S1 and S2, no murmur, no gallop, no rub, no click   Chest Wall:  No abnormalities observed   Abdomen:  Normal bowel sounds, no masses, no organomegaly, soft non-tender, non-distended, no guarding, no rebound tenderness   Rectal:  Deferred   Extremities:  Moves all extremities well, no edema, no cyanosis, no redness   Pulses:  Pulses palpable and equal bilaterally   Skin:  No bleeding, bruising or rash   Lymph nodes:  No palpable adenopathy   Neurologic:  Cranial nerves 2 - 12 grossly intact, sensation intact, DTR present and equal bilaterally       Results Review:    I reviewed the patient's new clinical results.  I reviewed  the patient's new imaging results and agree with the interpretation.  I reviewed the patient's other test results and agree with the interpretation  I personally viewed and interpreted the patient's EKG/Telemetry data    Assessment & Plan      Pancreatitis    Lactic acidosis    Transaminitis    Leukocytosis      Admit to hospitalist service  IV fluids   pain control  MRCP  Supportive care  NPO  Full Code        Dioni Chiu MD  02/16/25  02:37 EST          Electronically signed by Dioni Chiu MD at 02/16/25 0720       Emergency Department Notes    No notes of this type exist for this encounter.       Current Facility-Administered Medications   Medication Dose Route Frequency Provider Last Rate Last Admin   • acetaminophen (TYLENOL) tablet 650 mg  650 mg Oral Q6H PRN Elvira Majano APRN   650 mg at 02/16/25 1432   • sennosides-docusate (PERICOLACE) 8.6-50 MG per tablet 2 tablet  2 tablet Oral BID PRN Dioni Chiu MD        And   • polyethylene glycol (MIRALAX) packet 17 g  17 g Oral Daily PRN Dioni Chiu MD        And   • bisacodyl (DULCOLAX) EC tablet 5 mg  5 mg Oral Daily PRN Dioni Chiu MD        And   • bisacodyl (DULCOLAX) suppository 10 mg  10 mg Rectal Daily PRN Dioni Chiu MD       • dextrose (D50W) (25 g/50 mL) IV injection 25 g  25 g Intravenous Q15 Min PRN Elvira Majano APRN       • dextrose (GLUTOSE) oral gel 15 g  15 g Oral Q15 Min PRN Elvira Majano APRN       • glucagon (GLUCAGEN) injection 1 mg  1 mg Intramuscular Q15 Min PRN Elvira Mjaano APRN       • heparin (porcine) 5000 UNIT/ML injection 5,000 Units  5,000 Units Subcutaneous Q12H Dioni Chiu MD       • insulin regular (humuLIN R,novoLIN R) injection 2-7 Units  2-7 Units Subcutaneous Q6H Elvira Majano APRN       • lactated ringers infusion  250 mL/hr Intravenous Continuous Uriel Salas  mL/hr at 02/16/25 1437 250 mL/hr at 02/16/25 1437   • morphine injection 2 mg  2 mg Intravenous Q4H  PRN Dioni Chiu MD        And   • naloxone (NARCAN) injection 0.4 mg  0.4 mg Intravenous Q5 Min PRN Dioni Chiu MD       • morphine injection 4 mg  4 mg Intravenous Q4H PRN Dioni Chiu MD   4 mg at 25 0829    And   • naloxone (NARCAN) injection 0.4 mg  0.4 mg Intravenous Q5 Min PRN Dioni Chiu MD       • nitroglycerin (NITROSTAT) SL tablet 0.4 mg  0.4 mg Sublingual Q5 Min PRN Dioni Chiu MD       • ondansetron ODT (ZOFRAN-ODT) disintegrating tablet 4 mg  4 mg Oral Q6H PRN Dioni Chiu MD        Or   • ondansetron (ZOFRAN) injection 4 mg  4 mg Intravenous Q6H PRN Dioni Chiu MD       • sodium chloride 0.9 % flush 10 mL  10 mL Intravenous PRN Manpreet Barnard MD       • sodium chloride 0.9 % flush 10 mL  10 mL Intravenous Q12H Dioni Chiu MD   10 mL at 25 0832   • sodium chloride 0.9 % flush 10 mL  10 mL Intravenous PRN Dioni Chiu MD       • sodium chloride 0.9 % infusion 40 mL  40 mL Intravenous PRN Dioni Chiu MD            Physician Progress Notes (last 24 hours)        Uriel Salas MD at 25 1156           [facility]  Date: [facility]  Patient Name: Kevin Reyes  : 1970  MRN: [id number]  Date of admission: 2025  [place] [facility] [place]      Subjective   Subjective     Chief Complaint: Abdominal pain    Summary:Kevin Reyes is a 54 y.o. male who presents with abdominal pain.  Found to have pancreatitis.  Patient with worsening lactic acidosis.  Worsening leukocytosis.  GI consulted.  Patient transferring to ICU.     Interval Followup:   Patient was seen after receiving pain medication.  He said the pain medication is helping.  Denied current nausea and vomiting.     All systems reviewed and negative except for what is outlined above.      Objective   Objective     Vitals:   Temp:  [97.8 °F (36.6 °C)-99.3 °F (37.4 °C)] 99.3 °F (37.4 °C)  Heart Rate:  [] 101  Resp:  [20-24] 22  BP: (140-159)/(68-91)  140/68    Physical Exam   General: No acute distress  Cardiovascular: RRR, no murmurs   Pulmonary: no conversational dyspnea  Gastrointestinal: Abdomen tender  Psych: Mood and affect appropriate      Result Review    Result Review:  I have personally reviewed these results:  [x]  Laboratory      Lab 02/16/25  1054 02/16/25  0756 02/16/25  0426 02/16/25  0019   WBC  --  21.18*  --  17.34*   HEMOGLOBIN  --  17.5  --  18.3*   HEMATOCRIT  --  52.6*  --  56.0*   PLATELETS  --  290  --  300   NEUTROS ABS  --  18.59*  --  15.24*   IMMATURE GRANS (ABS)  --  0.09*  --  0.07*   LYMPHS ABS  --  1.26  --  1.16   MONOS ABS  --  1.19*  --  0.76   EOS ABS  --  0.01  --  0.02   MCV  --  89.3  --  90.0   LACTATE 4.1* 3.5* 3.0* 3.7*         Lab 02/16/25  0756 02/16/25  0019   SODIUM 137 138   POTASSIUM 4.5 4.6   CHLORIDE 100 102   CO2 23.5 24.3   ANION GAP 13.5 11.7   BUN 17 19   CREATININE 1.01 1.00   EGFR 88.4 89.4   GLUCOSE 148* 199*   CALCIUM 9.1 9.8         Lab 02/16/25  0019   TOTAL PROTEIN 8.4   ALBUMIN 4.2   GLOBULIN 4.2   ALT (SGPT) 204*   AST (SGOT) 152*   BILIRUBIN 1.0   ALK PHOS 159*   LIPASE 1,534*             Lab 02/16/25  0019   TRIGLYCERIDES 241*             Brief Urine Lab Results       None          [x]  Microbiology   Microbiology Results (last 10 days)       ** No results found for the last 240 hours. **          [x]  Radiology  CT Abdomen Pelvis With Contrast    Result Date: 2/16/2025  1.Acute pancreatitis. 2.There is an area of poor enhancement or nonenhancement involving the proximal pancreatic body and a portion of the pancreatic head. It is unclear if this is pancreatic necrosis or pancreatic mass. This could be better assessed with nonemergent MRI with  contrast. 3.There is wall thickening and wall enhancement involving the proximal duodenum, and there is surrounding fluid. This is probably reactive duodenitis from the pancreatitis. 4.Hepatic steatosis. 5.Massively distended urinary bladder with thickening  of the bladder wall. Findings could reflect a chronic bladder outlet obstruction with chronic cystitis. 6.Extensive atherosclerotic disease as described above. 7.Additional chronic findings as described above. Electronically Signed: Bijan Patel MD  2/16/2025 1:41 AM EST  Workstation ID: [id number]   []  EKG/Telemetry   []  Cardiology/Vascular   []  Pathology  []  Old records  []  Other:    Assessment & Plan        Assessment and Plan:    #Pancreatitis  #Lactic acidosis  #Leukocytosis  LR at 250 cc/h  GI consulted, appreciate recommendations  Transfer to ICU  IV morphine for pain control as needed  N.p.o.  MRCP         Discussed with RN.    VTE Prophylaxis:  Pharmacologic VTE prophylaxis orders are present.        CODE STATUS:   Code Status (Patient has no pulse and is not breathing): CPR (Attempt to Resuscitate)  Medical Interventions (Patient has pulse or is breathing): Full Support      Electronically signed by Uriel Salas MD, 2/16/2025, 11:56 EST.                         Electronically signed by Uriel Salas MD at 02/16/25 1218       Consult Notes (last 24 hours)  Notes from 02/15/25 1642 through 02/16/25 1642   No notes of this type exist for this encounter.

## 2025-02-16 NOTE — H&P
Patient Care Team:  Maria Luisa Schwartz APRN as PCP - General (Family Medicine)    Chief complaint abdominal pain    Subjective     Patient is a 54 y.o. male presents with abdominal pain that started in the morning.  The pain is located in the mid upper abdomen.  It has been worsening throughout the day.  Pain does radiate to the back.  He has associated nausea and dry heaves and 2 episodes of vomiting of nonbloody nonbilious emesis.  He denies other symptoms.  He denies any changes in bowel habits.  He has not had any melena nor hematochezia nor diarrhea.  Patient has not had significant right upper quadrant pain.  He does not have a history of high triglycerides.  He does have a history of alcohol abuse however has not drank in over 2 years.    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     Family History   Problem Relation Age of Onset    Heart disease Mother     Cancer Father     Stroke Maternal Uncle     Stroke Maternal Grandmother     Diabetes Maternal Grandmother      Social History     Tobacco Use    Smoking status: Every Day     Current packs/day: 1.00     Average packs/day: 1 pack/day for 37.1 years (37.1 ttl pk-yrs)     Types: Cigarettes     Start date: 1988     Passive exposure: Current    Smokeless tobacco: Never   Vaping Use    Vaping status: Never Used   Substance Use Topics    Alcohol use: Never    Drug use: Never     (Not in a hospital admission)   Allergies:  Patient has no known allergies.    Objective     Vital Signs  Temp:  [97.8 °F (36.6 °C)] 97.8 °F (36.6 °C)  Heart Rate:  [71-84] 71  Resp:  [24] 24  BP: (140-148)/(78-91) 148/78    Physical Exam:      General Appearance:  Alert, cooperative, in no acute distress   Head:  Normocephalic, without obvious abnormality, atraumatic   Eyes:   Lids and lashes normal, conjunctivae and sclerae normal, no icterus, no pallor, corneas clear, PERRLA   Ears:  Ears appear intact with no abnormalities noted   Throat:  No oral lesions, no thrush, oral mucosa moist   Neck:  No adenopathy, supple, trachea midline, no thyromegaly, no carotid bruit, no JVD   Back:  No kyphosis present, no scoliosis present, no skin lesions, erythema or scars, no tenderness to percussion or palpation, range of motion normal   Lungs:  Clear to auscultation, respirations regular, even and unlabored    Heart:  Regular rhythm and normal rate, normal S1 and S2, no murmur, no gallop, no rub, no click   Chest Wall:  No abnormalities observed   Abdomen:  Normal bowel sounds, no masses, no organomegaly, soft non-tender, non-distended, no guarding, no rebound tenderness   Rectal:  Deferred   Extremities:  Moves all extremities well, no edema, no cyanosis, no redness   Pulses:  Pulses palpable and equal bilaterally   Skin:  No bleeding, bruising or rash   Lymph nodes:  No palpable adenopathy   Neurologic:  Cranial nerves 2 - 12 grossly intact, sensation intact, DTR present and equal bilaterally       Results Review:    I reviewed the patient's new clinical results.  I reviewed the patient's new imaging results and agree with the interpretation.  I reviewed the patient's other test results and agree with the interpretation  I personally viewed and interpreted the patient's EKG/Telemetry data    Assessment & Plan       Pancreatitis    Lactic acidosis    Transaminitis    Leukocytosis      Admit to hospitalist service  IV fluids   pain control  MRCP  Supportive care  NPO  Full Code        Dioni Chiu MD  02/16/25  02:37 EST

## 2025-02-16 NOTE — PROGRESS NOTES
[facility]   Hospitalist Progress Note  Date: 2025  Patient Name: Kevin Reyes  : 1970  MRN: [id number]  Date of admission: 2025  [place] [facility] [place]      Subjective   Subjective     Chief Complaint: Abdominal pain    Summary:Kevin Reyes is a 54 y.o. male who presents with abdominal pain.  Found to have pancreatitis.  Patient with worsening lactic acidosis.  Worsening leukocytosis.  GI consulted.  Patient transferring to ICU.     Interval Followup:   Patient was seen after receiving pain medication.  He said the pain medication is helping.  Denied current nausea and vomiting.     All systems reviewed and negative except for what is outlined above.      Objective   Objective     Vitals:   Temp:  [97.8 °F (36.6 °C)-99.3 °F (37.4 °C)] 99.3 °F (37.4 °C)  Heart Rate:  [] 101  Resp:  [20-24] 22  BP: (140-159)/(68-91) 140/68    Physical Exam   General: No acute distress  Cardiovascular: RRR, no murmurs   Pulmonary: no conversational dyspnea  Gastrointestinal: Abdomen tender  Psych: Mood and affect appropriate      Result Review    Result Review:  I have personally reviewed these results:  [x]  Laboratory      Lab 25  1054 25  0756 25  0426 25  0019   WBC  --  21.18*  --  17.34*   HEMOGLOBIN  --  17.5  --  18.3*   HEMATOCRIT  --  52.6*  --  56.0*   PLATELETS  --  290  --  300   NEUTROS ABS  --  18.59*  --  15.24*   IMMATURE GRANS (ABS)  --  0.09*  --  0.07*   LYMPHS ABS  --  1.26  --  1.16   MONOS ABS  --  1.19*  --  0.76   EOS ABS  --  0.01  --  0.02   MCV  --  89.3  --  90.0   LACTATE 4.1* 3.5* 3.0* 3.7*         Lab 25  0756 25  0019   SODIUM 137 138   POTASSIUM 4.5 4.6   CHLORIDE 100 102   CO2 23.5 24.3   ANION GAP 13.5 11.7   BUN 17 19   CREATININE 1.01 1.00   EGFR 88.4 89.4   GLUCOSE 148* 199*   CALCIUM 9.1 9.8         Lab 25  0019   TOTAL PROTEIN 8.4   ALBUMIN 4.2   GLOBULIN 4.2   ALT (SGPT) 204*   AST (SGOT) 152*   BILIRUBIN 1.0    ALK PHOS 159*   LIPASE 1,534*             Lab 02/16/25  0019   TRIGLYCERIDES 241*             Brief Urine Lab Results       None          [x]  Microbiology   Microbiology Results (last 10 days)       ** No results found for the last 240 hours. **          [x]  Radiology  CT Abdomen Pelvis With Contrast    Result Date: 2/16/2025  1.Acute pancreatitis. 2.There is an area of poor enhancement or nonenhancement involving the proximal pancreatic body and a portion of the pancreatic head. It is unclear if this is pancreatic necrosis or pancreatic mass. This could be better assessed with nonemergent MRI with  contrast. 3.There is wall thickening and wall enhancement involving the proximal duodenum, and there is surrounding fluid. This is probably reactive duodenitis from the pancreatitis. 4.Hepatic steatosis. 5.Massively distended urinary bladder with thickening of the bladder wall. Findings could reflect a chronic bladder outlet obstruction with chronic cystitis. 6.Extensive atherosclerotic disease as described above. 7.Additional chronic findings as described above. Electronically Signed: Bijan Patel MD  2/16/2025 1:41 AM EST  Workstation ID: [id number]   []  EKG/Telemetry   []  Cardiology/Vascular   []  Pathology  []  Old records  []  Other:    Assessment & Plan        Assessment and Plan:    #Pancreatitis  #Lactic acidosis  #Leukocytosis  LR at 250 cc/h  GI consulted, appreciate recommendations  Transfer to ICU  IV morphine for pain control as needed  N.p.o.  MRCP         Discussed with RN.    VTE Prophylaxis:  Pharmacologic VTE prophylaxis orders are present.        CODE STATUS:   Code Status (Patient has no pulse and is not breathing): CPR (Attempt to Resuscitate)  Medical Interventions (Patient has pulse or is breathing): Full Support      Electronically signed by Uriel Salas MD, 2/16/2025, 11:56 EST.

## 2025-02-16 NOTE — NURSING NOTE
SHARON RN attempted two calls to MRI to check status on taking patient down for MRCP. [place] RUEDA called two times. No response. SHARON MCPHERSON

## 2025-02-16 NOTE — PLAN OF CARE
Transfer[place] [facility] at 1200. Upon arrival, pt attempted to urinate. Bladder scanned per APRN order, see I&O flowsheet. Tolerated reina catheter placement well. NPO, ice chips okay per APRN. Continuous LR running at 250 ml/hr. PD RN

## 2025-02-16 NOTE — CONSULTS
Pulmonary / Critical Care Consult Note      Patient Name: Kevin Reyes  : 1970  MRN: [id number]  Primary Care Physician:  Maria Luisa Schwartz APRN  Referring Physician: Uriel Salas MD  Date of admission: 2025    Subjective   Subjective     Reason for Consult/ Chief Complaint: Pancreatitis    HPI:  Kevin Reyes is a 54 y.o. male with history of hypertension, presented to the ED with complaints of nausea, vomiting and abdominal pain.  CT scan of the abdomen showed acute pancreatitis.  Laboratory findings showed leukocytosis, lactic acid 3.7, lipase 1500, AST/AST were elevated, bilirubin normal.  He was given 1 L IV fluids in the ED, GI was consulted.  This morning lactic acid did trend up to 3.5 then 4.1.  He was transferred to the ICU for ongoing care.        Vitals:    25 1800   BP: 127/68   Pulse: 93   Resp: 19   Temp: 100.2 °F (37.9 °C)   SpO2: 93%       Physical Exam:  Vital signs reviewed  Patient does have tachycardia  His abdomen is soft does have some epigastric pain    Result Review    Result Review:  I have personally reviewed the results from the time of this admission to 2025 12:25 EST and agree with these findings:  [x]  Laboratory  [x]  Microbiology  [x]  Radiology  [x]  EKG/Telemetry   []  Cardiology/Vascular   []  Pathology  []  Old records  []  Other:  Most notable findings include:   .    Assessment & Plan   Assessment / Plan     Active Hospital Problems:  Active Hospital Problems    Diagnosis     **Pancreatitis     Lactic acidosis     Transaminitis     Leukocytosis      Impression:  Acute pancreatitis  Clinically significant lactic acidosis  Urinary retention  Volume depletion  Transaminitis  Urinary tract infection likely secondary to urinary retention    Plan:  Patient appears to be woefully under resuscitated patient does have elevated hematocrit of 52.6 however this is down some from overnight  We will bolus again today  LR to 50/h  GI  following  Patient with urinary retention with significantly distended bladder on imaging  Reese catheter placed  Urinalysis concerning for urinary tract infection antibiotics has been started  This makes sense given his profound retention  Flomax started as well  LDH checked to calculate Anson criteria at 48 hours  Continue pain medications  MRCP ordered      VTE Prophylaxis: heparin  Pharmacologic VTE prophylaxis orders are present.         Code Status and Medical Interventions: CPR (Attempt to Resuscitate); Full Support   Ordered at: 02/16/25 0236     Code Status (Patient has no pulse and is not breathing):    CPR (Attempt to Resuscitate)     Medical Interventions (Patient has pulse or is breathing):    Full Support      I, KELSI Aldana, spent 14 minutes critical care time.  Electronically signed by KELSI Decker, 02/16/25, 12:25 PM EST.    Total critical care time spent of service 44 minutes  The patient is critically ill in the ICU with acute pancreatitis, clinically significant lactic acidosis. Multidisciplinary bedside critical care rounds were performed with nursing staff, respiratory therapy, pharmacy, nutritional services, social work. I have personally reviewed the chart, labs and any pertinent imaging available.  I have spent 30 minutes of critical care time, excluding procedures, in the care of this patient.    Electronically signed by Earl Silva DO, 02/16/25, 6:49 PM EST.

## 2025-02-16 NOTE — ED PROVIDER NOTES
Time: 12:16 AM EST  Date of encounter:  2/15/2025  Independent Historian/Clinical History and Information was obtained by:   Patient    History is limited by: N/A    Chief complaint: Abdominal pain      History of Present Illness:  Patient is a 54 y.o. year old male who presents to the emergency department for evaluation of abdominal pain.  Patient states he did have an upper abdominal pain this morning.  It is increased in severity.  It does slightly radiate to the back.  Patient notes nausea and dry heaves.  He did vomit 2 times.  There is no coffee-ground emesis or hematemesis.  Patient has no fever rigors or myalgias.  He does note a cough.  He denies any shortness of breath.  Patient states his bowel movements are normal.  He has no diarrhea, hematochezia or melena.  The patient has no urinary frequency urgency or dysuria.  Patient does note that he has weakness in both legs from a prior surgery 25 years ago.  Patient states he used to abuse alcohol but he has not drank in over 2 years.  Patient denies history of peptic ulcer disease.    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     Family History   Problem Relation Age of Onset    Heart disease Mother     Cancer Father     Stroke Maternal Uncle     Stroke Maternal Grandmother     Diabetes Maternal Grandmother        Home Medications:  Prior to Admission medications    Medication Sig Start Date End Date Taking? Authorizing Provider   levothyroxine (SYNTHROID, LEVOTHROID) 25 MCG tablet Take 1 tablet by mouth Every Morning. 11/29/23   Maria Luisa Schwartz APRN        Social History:   Social History     Tobacco Use    Smoking status: Every Day     Current packs/day: 1.00     Average packs/day: 1 pack/day for  "37.1 years (37.1 ttl pk-yrs)     Types: Cigarettes     Start date: 1988     Passive exposure: Current    Smokeless tobacco: Never   Vaping Use    Vaping status: Never Used   Substance Use Topics    Alcohol use: Never    Drug use: Never         Review of Systems:  Review of Systems   Constitutional:  Negative for chills, diaphoresis and fever.   HENT:  Negative for congestion, postnasal drip, rhinorrhea and sore throat.    Eyes:  Negative for photophobia.   Respiratory:  Positive for cough. Negative for chest tightness and shortness of breath.    Cardiovascular:  Negative for chest pain, palpitations and leg swelling.   Gastrointestinal:  Positive for abdominal pain, nausea and vomiting. Negative for diarrhea.   Genitourinary:  Negative for difficulty urinating, dysuria, flank pain, frequency, hematuria and urgency.   Musculoskeletal:  Negative for neck pain and neck stiffness.   Skin:  Negative for pallor and rash.   Neurological:  Negative for dizziness, syncope, weakness, numbness and headaches.   Hematological:  Negative for adenopathy. Does not bruise/bleed easily.   Psychiatric/Behavioral: Negative.          Physical Exam:  /78   Pulse 71   Temp 97.8 °F (36.6 °C) (Oral)   Resp 24   Ht 174 cm (68.5\")   SpO2 95%   BMI 25.92 kg/m²         Physical Exam  Vitals and nursing note reviewed.   Constitutional:       General: He is not in acute distress.     Appearance: Normal appearance. He is not ill-appearing, toxic-appearing or diaphoretic.   HENT:      Head: Normocephalic and atraumatic.      Mouth/Throat:      Mouth: Mucous membranes are moist.   Eyes:      Pupils: Pupils are equal, round, and reactive to light.   Cardiovascular:      Rate and Rhythm: Normal rate and regular rhythm.      Pulses: Normal pulses.           Carotid pulses are 2+ on the right side and 2+ on the left side.       Radial pulses are 2+ on the right side and 2+ on the left side.        Femoral pulses are 2+ on the right side and " 2+ on the left side.       Popliteal pulses are 2+ on the right side and 2+ on the left side.        Dorsalis pedis pulses are 2+ on the right side and 2+ on the left side.        Posterior tibial pulses are 2+ on the right side and 2+ on the left side.      Heart sounds: Normal heart sounds. No murmur heard.  Pulmonary:      Effort: Pulmonary effort is normal. No accessory muscle usage, respiratory distress or retractions.      Breath sounds: Normal breath sounds. No wheezing, rhonchi or rales.   Abdominal:      General: Abdomen is flat. There is no distension.      Palpations: Abdomen is soft. There is no mass or pulsatile mass.      Tenderness: There is abdominal tenderness in the right upper quadrant, epigastric area and left upper quadrant. There is no right CVA tenderness, left CVA tenderness, guarding or rebound. Negative signs include Mendez's sign and McBurney's sign.      Comments: No rigidity   Musculoskeletal:         General: No swelling, tenderness or deformity.      Cervical back: Neck supple. No tenderness.      Right lower leg: No edema.      Left lower leg: No edema.   Skin:     General: Skin is warm and dry.      Capillary Refill: Capillary refill takes less than 2 seconds.      Coloration: Skin is not jaundiced or pale.      Findings: No erythema.   Neurological:      General: No focal deficit present.      Mental Status: He is alert and oriented to person, place, and time. Mental status is at baseline.      Cranial Nerves: Cranial nerves 2-12 are intact. No cranial nerve deficit.      Sensory: Sensation is intact. No sensory deficit.      Motor: Weakness present. No pronator drift.      Comments: Patient has weakness in both legs   Psychiatric:         Mood and Affect: Mood normal.         Behavior: Behavior normal.                        Medical Decision Making:      Comorbidities that affect care:    Hypertension, hypothyroid    External Notes reviewed:    None      The following orders were  placed and all results were independently analyzed by me:  Orders Placed This Encounter   Procedures    CT Abdomen Pelvis With Contrast    [place] Draw    Comprehensive Metabolic Panel    Lipase    Urinalysis With Microscopic If Indicated (No Culture) - Urine, Clean Catch    Lactic Acid, Plasma    CBC Auto Differential    STAT Lactic Acid, Reflex    Ethanol    Triglycerides    NPO Diet NPO Type: Strict NPO    Undress & Gown    Inpatient Hospitalist Consult    Insert Peripheral IV    Inpatient Admission    CBC & Differential    Green Top (Gel)    Lavender Top    Gold Top - SST    Light Blue Top       Medications Given in the Emergency Department:  Medications   sodium chloride 0.9 % flush 10 mL (has no administration in time range)   lactated ringers bolus 1,000 mL (has no administration in time range)   morphine injection 4 mg (has no administration in time range)   ondansetron (ZOFRAN) injection 4 mg (has no administration in time range)   iopamidol (ISOVUE-370) 76 % injection 100 mL (100 mL Intravenous Given 2/16/25 0116)        ED Course:    The patient was initially evaluated in the triage area where orders were placed. The patient was later dispositioned by Ever Smith DO.      The patient was advised to stay for completion of workup which includes but is not limited to communication of labs and radiological results, reassessment and plan. The patient was advised that leaving prior to disposition by a provider could result in critical findings that are not communicated to the patient.     ED Course as of 02/16/25 0233   Sun Feb 16, 2025   0017 PROVIDER IN TRIAGE  Patient was evaluated by Matthew skinner PA-C. Orders were placed and awaiting final results and disposition.   [MV]      ED Course User Index  [MV] Matthew Kiran PA       Labs:    Lab Results (last 24 hours)       Procedure Component Value Units Date/Time    CBC & Differential [[id number]]  (Abnormal) Collected: 02/16/25 0019    Specimen: Blood from  Arm, Right Updated: 02/16/25 0025    Narrative:      The following orders were created for panel order CBC & Differential.  Procedure                               Abnormality         Status                     ---------                               -----------         ------                     CBC Auto Differential[[id number]]        Abnormal            Final result                 Please view results for these tests on the individual orders.    Comprehensive Metabolic Panel [[id number]]  (Abnormal) Collected: 02/16/25 0019    Specimen: Blood from Arm, Right Updated: 02/16/25 0043     Glucose 199 mg/dL      BUN 19 mg/dL      Creatinine 1.00 mg/dL      Sodium 138 mmol/L      Potassium 4.6 mmol/L      Comment: Slight hemolysis detected by analyzer. Result may be falsely elevated.        Chloride 102 mmol/L      CO2 24.3 mmol/L      Calcium 9.8 mg/dL      Total Protein 8.4 g/dL      Albumin 4.2 g/dL      ALT (SGPT) 204 U/L      AST (SGOT) 152 U/L      Alkaline Phosphatase 159 U/L      Total Bilirubin 1.0 mg/dL      Globulin 4.2 gm/dL      A/G Ratio 1.0 g/dL      BUN/Creatinine Ratio 19.0     Anion Gap 11.7 mmol/L      eGFR 89.4 mL/min/1.73     Narrative:      GFR Categories in Chronic Kidney Disease (CKD)      GFR Category          GFR (mL/min/1.73)    Interpretation  G1                     90 or greater         Normal or high (1)  G2                      60-89                Mild decrease (1)  G3a                   45-59                Mild to moderate decrease  G3b                   30-44                Moderate to severe decrease  G4                    15-29                Severe decrease  G5                    14 or less           Kidney failure          (1)In the absence of evidence of kidney disease, neither GFR category G1 or G2 fulfill the criteria for CKD.    eGFR calculation 2021 CKD-EPI creatinine equation, which does not include race as a factor    Lipase [[id number]]  (Abnormal) Collected: 02/16/25 0019     Specimen: Blood from Arm, Right Updated: 02/16/25 0051     Lipase 1,534 U/L     Lactic Acid, Plasma [[id number]]  (Abnormal) Collected: 02/16/25 0019    Specimen: Blood from Arm, Right Updated: 02/16/25 0056     Lactate 3.7 mmol/L     CBC Auto Differential [[id number]]  (Abnormal) Collected: 02/16/25 0019    Specimen: Blood from Arm, Right Updated: 02/16/25 0025     WBC 17.34 10*3/mm3      RBC 6.22 10*6/mm3      Hemoglobin 18.3 g/dL      Hematocrit 56.0 %      MCV 90.0 fL      MCH 29.4 pg      MCHC 32.7 g/dL      RDW 13.6 %      RDW-SD 44.7 fl      MPV 9.3 fL      Platelets 300 10*3/mm3      Neutrophil % 87.9 %      Lymphocyte % 6.7 %      Monocyte % 4.4 %      Eosinophil % 0.1 %      Basophil % 0.5 %      Immature Grans % 0.4 %      Neutrophils, Absolute 15.24 10*3/mm3      Lymphocytes, Absolute 1.16 10*3/mm3      Monocytes, Absolute 0.76 10*3/mm3      Eosinophils, Absolute 0.02 10*3/mm3      Basophils, Absolute 0.09 10*3/mm3      Immature Grans, Absolute 0.07 10*3/mm3      nRBC 0.0 /100 WBC     Ethanol [[id number]] Collected: 02/16/25 0019    Specimen: Blood from Arm, Right Updated: 02/16/25 0223    Triglycerides [[id number]] Collected: 02/16/25 0019    Specimen: Blood from Arm, Right Updated: 02/16/25 0223             Imaging:    CT Abdomen Pelvis With Contrast    Result Date: 2/16/2025  CT ABDOMEN PELVIS W CONTRAST Date of Exam: 2/16/2025 1:13 AM EST Indication: Abdominal pain and nausea. Comparison: None available. Technique: Axial CT images were obtained of the abdomen and pelvis after the uneventful intravenous administration of iodinated contrast. Reconstructed coronal and sagittal images were also obtained. Automated exposure control and iterative construction methods were used. Findings: Minimal atelectasis noted in the right lower lobe. There is atherosclerotic disease which is fairly extensive. There is marked luminal narrowing in the lower aorta. The right common iliac artery is occluded with some  reconstitution of small caliber internal and external iliac arteries on the right. There is a high-grade stenosis in the proximal left common iliac artery. There is also probably a high-grade stenosis in the proximal internal iliac arteries on both sides. The left external iliac artery  is somewhat small in caliber but fairly widely patent. The gallbladder is contracted. No biliary obstruction is seen. There are numerous surgical clips in the retroperitoneum. Correlate with surgical history. There is diffuse atrophy of the paraspinous musculature, as well as the right psoas muscle and right side hip and proximal thigh musculature. There is some cortical scarring in both kidneys. There is a right renal cortical cyst. No follow-up is indicated. Both kidneys are otherwise normal and nonobstructed. There is hepatic steatosis. No definite liver mass. The adrenal glands and spleen are normal. There is some stranding around the pancreas compatible with acute pancreatitis. Additionally, there is an area of poor enhancement or nonenhancement involving the proximal pancreatic body and a portion of the pancreatic head. It is unclear if this is pancreatic necrosis or pancreatic mass. The area measures roughly 4.6 x 3.3 cm. No pancreatic ductal dilatation. This could be better assessed with nonemergent MRI with contrast. Urinary bladder is massively distended, and there is some thickening of the urinary bladder wall. Findings could reflect a chronic bladder outlet obstruction with chronic cystitis. Prostate is grossly normal for size. The appendix is normal. No evidence of acute colitis. No small bowel obstruction is seen. There is wall thickening and wall enhancement involving the proximal duodenum, and there is surrounding fluid. This is probably reactive duodenitis from the pancreatitis. Fluid extends out laterally into the anterior right abdomen and into the right paracolic gutter.     1.Acute pancreatitis. 2.There is an  area of poor enhancement or nonenhancement involving the proximal pancreatic body and a portion of the pancreatic head. It is unclear if this is pancreatic necrosis or pancreatic mass. This could be better assessed with nonemergent MRI with  contrast. 3.There is wall thickening and wall enhancement involving the proximal duodenum, and there is surrounding fluid. This is probably reactive duodenitis from the pancreatitis. 4.Hepatic steatosis. 5.Massively distended urinary bladder with thickening of the bladder wall. Findings could reflect a chronic bladder outlet obstruction with chronic cystitis. 6.Extensive atherosclerotic disease as described above. 7.Additional chronic findings as described above. Electronically Signed: Bijan Patel MD  2/16/2025 1:41 AM EST  Workstation ID: [id number]       Differential Diagnosis and Discussion:      Abdominal Pain: Based on the patient's signs and symptoms, I considered abdominal aortic aneurysm, small bowel obstruction, pancreatitis, acute cholecystitis, acute appendecitis, peptic ulcer disease, gastritis, colitis, endocrine disorders, irritable bowel syndrome and other differential diagnosis an etiology of the patient's abdominal pain.    PROCEDURES:    Labs were collected in the emergency department and all labs were reviewed and interpreted by me.    No orders to display        Procedures    MDM  Number of Diagnoses or Management Options  Idiopathic acute pancreatitis, unspecified complication status  Pain of upper abdomen  Diagnosis management comments: Sepsis was not present in the emergency department or on arrival. This is supported as the patient does not either meet two out of the four SIRS criteria or has an obvious bacterial infection.      SIRS criteria considered:   1.                     Temperature > 100.4 or <98.6    2.                     Heart Rate > 90    3.                     Respiratory Rate > 22    4.                     WBC > 12K or <4K.    The patient  meets SIRS criteria but there is no bacterial source at this time    The patient's white blood cell count was elevated at 17,000.  The patient's CBC was reviewed and shows no abnormalities of critical concern.  Of note, there is no anemia requiring a blood transfusion and the platelet count is acceptable    The patient's CMP was reviewed and shows no abnormalities of critical concern.  Of note, the patient's sodium and potassium are acceptable.  The patient did have liver enzymes that were elevated.  The patient's alk phos is 159, , ALT of 204.  The patient's renal function including creatinine is preserved.  The patient has a normal anion gap.    The patient's lipase was elevated at 1534 consistent with acute pancreatitis    The patient's lactate was elevated at 3.7.  We bolused the patient with a liter of Ringer's.    An alcohol level was ordered.  The alcohol level returned as undetectable    CT scan of the abdomen pelvis was performed.  The CT was consistent with elevated lipase.  The patient CT scan demonstrated acute pancreatitis.  The patient had a distended urinary bladder with thickened wall.      The patient's pain is treated with morphine and Zofran    The patient is stable at the time of admission.  The patient's vital signs are stable.  The patient is in no distress and appears to be resting comfortably.  The patient is at their baseline mental status       Amount and/or Complexity of Data Reviewed  Clinical lab tests: reviewed and ordered  Tests in the radiology section of CPT®: reviewed and ordered  Tests in the medicine section of CPT®: ordered and reviewed  Discuss the patient with other providers: yes (02:33 EST  I discussed the case with the hospitalist, Dr. Chiu.  We have discussed the patient's presenting symptoms, laboratory values, imaging and condition at the time of admission.  They will evaluate the patient in the emergency room and admit the patient to the hospital)            Social Determinants of Health:    Patient is independent, reliable, and has access to care.       Disposition and Care Coordination:    Admit:   Through independent evaluation of the patient's history, physical, and imperical data, the patient meets criteria for inpatient admission to the hospital.        Final diagnoses:   Idiopathic acute pancreatitis, unspecified complication status   Pain of upper abdomen        ED Disposition       ED Disposition   Decision to Admit    Condition   --    Comment   Level of Care: Telemetry [5]   Diagnosis: Pancreatitis [202663]   Certification: I Certify That Inpatient Hospital Services Are Medically Necessary For Greater Than 2 Midnights                 This medical record created using voice recognition software.             Ever Smith DO  02/20/25 0129

## 2025-02-16 NOTE — CONSULTS
Chief Complaint  Abdominal Pain (PT ARRIVED FROM HOME POV WITH FAMILY REPORTING LUQ ABDOMINAL PAIN SINCE THIS AM. PT REPORTS H TRIED TO VOMIT THIS MORNING AND THEN THE PAIN STARTED. )    History of Present Illness  Kevin Reyes is a 54 y.o. male with history of hypertension chronic back pain surgery, cancer of the spine, spine surgery who presents to [facility] on referral from No ref. provider found for a gastroenterology evaluation of acute pancreatitis  Patient states that this morning patient started having abdominal pain.  This pain was epigastric and right upper quadrant.  This was achy pain.  The pain was about 7-8/10 intensity.  It radiated to the back.  There was no precipitating relieving factors.  He had 2 episodes of nonbloody emesis.  No fever shakes or chills.  Patient stated he used to drink alcohol in the past but he stopped drinking 2 years ago.  Never had pancreatitis before.  His triglyceride is normal and his LFTs are mildly elevated.  CT scan of abdomen pelvis showed possible necrosis in the head and the proximal body of the pancreas      Labs Result Review Imaging    Past Medical History:   Diagnosis Date    Cancer     Hypertension        Past Surgical History:   Procedure Laterality Date    BACK SURGERY      Had cancer near his spine, now in wheelchair.         Current Facility-Administered Medications:     acetaminophen (TYLENOL) tablet 650 mg, 650 mg, Oral, Q6H PRN, Elvira Majano APRN, 650 mg at 02/16/25 1432    sennosides-docusate (PERICOLACE) 8.6-50 MG per tablet 2 tablet, 2 tablet, Oral, BID PRN **AND** polyethylene glycol (MIRALAX) packet 17 g, 17 g, Oral, Daily PRN **AND** bisacodyl (DULCOLAX) EC tablet 5 mg, 5 mg, Oral, Daily PRN **AND** bisacodyl (DULCOLAX) suppository 10 mg, 10 mg, Rectal, Daily PRN, Dioni Chiu MD    cefTRIAXone (ROCEPHIN) in NS 2 GRAMS/20ml IV PUSH syringe, 2,000 mg, Intravenous, Q24H, Elvira Majano, APRN     dextrose (D50W) (25 g/50 mL) IV injection 25 g, 25 g, Intravenous, Q15 Min PRN, Elvira Majano APRN    dextrose (GLUTOSE) oral gel 15 g, 15 g, Oral, Q15 Min PRN, Elvira Majano APRN    glucagon (GLUCAGEN) injection 1 mg, 1 mg, Intramuscular, Q15 Min PRN, Elvira Majano APRN    heparin (porcine) 5000 UNIT/ML injection 5,000 Units, 5,000 Units, Subcutaneous, Q12H, Dioni Chiu MD    insulin regular (humuLIN R,novoLIN R) injection 2-7 Units, 2-7 Units, Subcutaneous, Q6H, Elvira Majano APRN    lactated ringers infusion, 250 mL/hr, Intravenous, Continuous, Uriel Salas MD, Last Rate: 250 mL/hr at 02/16/25 1437, 250 mL/hr at 02/16/25 1437    morphine injection 2 mg, 2 mg, Intravenous, Q4H PRN **AND** naloxone (NARCAN) injection 0.4 mg, 0.4 mg, Intravenous, Q5 Min PRN, Dioni Chiu MD    morphine injection 4 mg, 4 mg, Intravenous, Q4H PRN, 4 mg at 02/16/25 0829 **AND** naloxone (NARCAN) injection 0.4 mg, 0.4 mg, Intravenous, Q5 Min PRN, Dioni Chiu MD    nitroglycerin (NITROSTAT) SL tablet 0.4 mg, 0.4 mg, Sublingual, Q5 Min PRN, Dioni Chiu MD    ondansetron ODT (ZOFRAN-ODT) disintegrating tablet 4 mg, 4 mg, Oral, Q6H PRN **OR** ondansetron (ZOFRAN) injection 4 mg, 4 mg, Intravenous, Q6H PRN, Dioni Chiu MD    sodium chloride 0.9 % flush 10 mL, 10 mL, Intravenous, PRN, Manpreet Barnard MD    sodium chloride 0.9 % flush 10 mL, 10 mL, Intravenous, Q12H, Dioni Chiu MD, 10 mL at 02/16/25 0832    sodium chloride 0.9 % flush 10 mL, 10 mL, Intravenous, PRN, Dioni Chiu MD    sodium chloride 0.9 % infusion 40 mL, 40 mL, Intravenous, PRN, Dioni Chiu MD    tamsulosin (FLOMAX) 24 hr capsule 0.4 mg, 0.4 mg, Oral, Daily, Elvira Majano, APRN, 0.4 mg at 02/16/25 1854     No Known Allergies    Family History   Problem Relation Age of Onset    Heart disease Mother     Cancer Father     Stroke Maternal Uncle     Stroke Maternal Grandmother     Diabetes Maternal Grandmother      "    Social History     Social History Narrative    Not on file       Immunization:  Immunization History   Administered Date(s) Administered    Fluzone (or Fluarix & Flulaval for VFC) >6mos 11/20/2023        Objective     Review of Systems 10 system review negative except as mentioned HPI    Vital Signs:   /67 (BP Location: Right arm, Patient Position: Lying)   Pulse 94   Temp 100.4 °F (38 °C) (Bladder)   Resp 17   Ht 174 cm (68.5\")   Wt 85 kg (187 lb 6.3 oz)   SpO2 91%   BMI 28.08 kg/m²       Physical Exam  Constitutional:       General: He is awake. He is not in acute distress.     Appearance: Normal appearance. He is well-developed and well-groomed.   HENT:      Head: Normocephalic and atraumatic.      Mouth/Throat:      Mouth: Mucous membranes are moist.      Comments: Elfego dental hygiene is good  Eyes:      General: Lids are normal.      Conjunctiva/sclera: Conjunctivae normal.      Pupils: Pupils are equal, round, and reactive to light.   Neck:      Thyroid: No thyroid mass.      Trachea: Trachea normal.   Cardiovascular:      Rate and Rhythm: Normal rate and regular rhythm.      Heart sounds: Normal heart sounds.   Pulmonary:      Effort: Pulmonary effort is normal.      Breath sounds: Normal breath sounds and air entry.   Abdominal:      General: Abdomen is flat. Bowel sounds are normal. There is no distension.      Palpations: Abdomen is soft. There is no mass.      Tenderness: There is no abdominal tenderness. There is no guarding.   Musculoskeletal:      Cervical back: Neck supple.      Right lower leg: No edema.      Left lower leg: No edema.   Skin:     General: Skin is warm and moist.      Coloration: Skin is not cyanotic, jaundiced or pale.      Findings: No rash.      Nails: There is no clubbing.   Neurological:      Mental Status: He is alert and oriented to person, place, and time.   Psychiatric:         Attention and Perception: Attention normal.         Mood and Affect: Mood and " affect normal.         Speech: Speech normal.         Labs:  Results from last 7 days   Lab Units 02/16/25  0756 02/16/25  0019   WBC 10*3/mm3 21.18* 17.34*   HEMOGLOBIN g/dL 17.5 18.3*   HEMATOCRIT % 52.6* 56.0*   PLATELETS 10*3/mm3 290 300      Results from last 7 days   Lab Units 02/16/25  0756 02/16/25  0019   SODIUM mmol/L 137 138   POTASSIUM mmol/L 4.5 4.6   CHLORIDE mmol/L 100 102   CO2 mmol/L 23.5 24.3   BUN mg/dL 17 19   CREATININE mg/dL 1.01 1.00   CALCIUM mg/dL 9.1 9.8   BILIRUBIN mg/dL  --  1.0   ALK PHOS U/L  --  159*   ALT (SGPT) U/L  --  204*   AST (SGOT) U/L  --  152*   GLUCOSE mg/dL 148* 199*             Assessment & Plan:  Principal Problem:    Pancreatitis  Active Problems:    Lactic acidosis    Transaminitis    Leukocytosis     Plan to continue IV hydration, ICU care await for MRCP in the morning.  Will also check IgG4 levels and ionized calcium level.  Patient was given instructions and counseling regarding his condition or for health maintenance advice. Please see specific information pulled into the AVS if appropriate.        Signed:  Angie Ann MD  02/16/25  18:11 EST

## 2025-02-17 ENCOUNTER — APPOINTMENT (OUTPATIENT)
Dept: MRI IMAGING | Facility: HOSPITAL | Age: 55
DRG: 871 | End: 2025-02-17
Payer: MEDICARE

## 2025-02-17 LAB
ANION GAP SERPL CALCULATED.3IONS-SCNC: 7.2 MMOL/L (ref 5–15)
BASOPHILS # BLD AUTO: 0.05 10*3/MM3 (ref 0–0.2)
BASOPHILS NFR BLD AUTO: 0.2 % (ref 0–1.5)
BUN SERPL-MCNC: 11 MG/DL (ref 6–20)
BUN/CREAT SERPL: 16.7 (ref 7–25)
CALCIUM SPEC-SCNC: 8.8 MG/DL (ref 8.6–10.5)
CHLORIDE SERPL-SCNC: 102 MMOL/L (ref 98–107)
CO2 SERPL-SCNC: 24.8 MMOL/L (ref 22–29)
CREAT SERPL-MCNC: 0.66 MG/DL (ref 0.76–1.27)
DEPRECATED RDW RBC AUTO: 44 FL (ref 37–54)
EGFRCR SERPLBLD CKD-EPI 2021: 111.5 ML/MIN/1.73
EOSINOPHIL # BLD AUTO: 0.03 10*3/MM3 (ref 0–0.4)
EOSINOPHIL NFR BLD AUTO: 0.1 % (ref 0.3–6.2)
ERYTHROCYTE [DISTWIDTH] IN BLOOD BY AUTOMATED COUNT: 13.6 % (ref 12.3–15.4)
GLUCOSE BLDC GLUCOMTR-MCNC: 114 MG/DL (ref 70–99)
GLUCOSE BLDC GLUCOMTR-MCNC: 117 MG/DL (ref 70–99)
GLUCOSE BLDC GLUCOMTR-MCNC: 118 MG/DL (ref 70–99)
GLUCOSE BLDC GLUCOMTR-MCNC: 120 MG/DL (ref 70–99)
GLUCOSE SERPL-MCNC: 125 MG/DL (ref 65–99)
HCT VFR BLD AUTO: 45.2 % (ref 37.5–51)
HGB BLD-MCNC: 15.1 G/DL (ref 13–17.7)
IMM GRANULOCYTES # BLD AUTO: 0.11 10*3/MM3 (ref 0–0.05)
IMM GRANULOCYTES NFR BLD AUTO: 0.4 % (ref 0–0.5)
LYMPHOCYTES # BLD AUTO: 1.49 10*3/MM3 (ref 0.7–3.1)
LYMPHOCYTES NFR BLD AUTO: 5.8 % (ref 19.6–45.3)
MAGNESIUM SERPL-MCNC: 1.6 MG/DL (ref 1.6–2.6)
MCH RBC QN AUTO: 29.6 PG (ref 26.6–33)
MCHC RBC AUTO-ENTMCNC: 33.4 G/DL (ref 31.5–35.7)
MCV RBC AUTO: 88.6 FL (ref 79–97)
MONOCYTES # BLD AUTO: 1.79 10*3/MM3 (ref 0.1–0.9)
MONOCYTES NFR BLD AUTO: 6.9 % (ref 5–12)
NEUTROPHILS NFR BLD AUTO: 22.29 10*3/MM3 (ref 1.7–7)
NEUTROPHILS NFR BLD AUTO: 86.6 % (ref 42.7–76)
NRBC BLD AUTO-RTO: 0 /100 WBC (ref 0–0.2)
PLATELET # BLD AUTO: 227 10*3/MM3 (ref 140–450)
PMV BLD AUTO: 9.5 FL (ref 6–12)
POTASSIUM SERPL-SCNC: 3.9 MMOL/L (ref 3.5–5.2)
RBC # BLD AUTO: 5.1 10*6/MM3 (ref 4.14–5.8)
SODIUM SERPL-SCNC: 134 MMOL/L (ref 136–145)
WBC NRBC COR # BLD AUTO: 25.76 10*3/MM3 (ref 3.4–10.8)

## 2025-02-17 PROCEDURE — 74181 MRI ABDOMEN W/O CONTRAST: CPT

## 2025-02-17 PROCEDURE — 99232 SBSQ HOSP IP/OBS MODERATE 35: CPT | Performed by: INTERNAL MEDICINE

## 2025-02-17 PROCEDURE — 25010000002 HEPARIN (PORCINE) PER 1000 UNITS: Performed by: STUDENT IN AN ORGANIZED HEALTH CARE EDUCATION/TRAINING PROGRAM

## 2025-02-17 PROCEDURE — 25010000002 CALCIUM GLUCONATE 2-0.675 GM/100ML-% SOLUTION: Performed by: NURSE PRACTITIONER

## 2025-02-17 PROCEDURE — 83735 ASSAY OF MAGNESIUM: CPT | Performed by: NURSE PRACTITIONER

## 2025-02-17 PROCEDURE — 25010000002 MAGNESIUM SULFATE 4 GM/100ML SOLUTION: Performed by: NURSE PRACTITIONER

## 2025-02-17 PROCEDURE — 85025 COMPLETE CBC W/AUTO DIFF WBC: CPT | Performed by: STUDENT IN AN ORGANIZED HEALTH CARE EDUCATION/TRAINING PROGRAM

## 2025-02-17 PROCEDURE — 25010000002 CEFTRIAXONE PER 250 MG: Performed by: NURSE PRACTITIONER

## 2025-02-17 PROCEDURE — 99291 CRITICAL CARE FIRST HOUR: CPT | Performed by: INTERNAL MEDICINE

## 2025-02-17 PROCEDURE — 97165 OT EVAL LOW COMPLEX 30 MIN: CPT

## 2025-02-17 PROCEDURE — 82948 REAGENT STRIP/BLOOD GLUCOSE: CPT

## 2025-02-17 PROCEDURE — 99232 SBSQ HOSP IP/OBS MODERATE 35: CPT | Performed by: STUDENT IN AN ORGANIZED HEALTH CARE EDUCATION/TRAINING PROGRAM

## 2025-02-17 PROCEDURE — 80048 BASIC METABOLIC PNL TOTAL CA: CPT | Performed by: STUDENT IN AN ORGANIZED HEALTH CARE EDUCATION/TRAINING PROGRAM

## 2025-02-17 PROCEDURE — 25810000003 LACTATED RINGERS PER 1000 ML: Performed by: STUDENT IN AN ORGANIZED HEALTH CARE EDUCATION/TRAINING PROGRAM

## 2025-02-17 RX ORDER — MAGNESIUM SULFATE HEPTAHYDRATE 40 MG/ML
4 INJECTION, SOLUTION INTRAVENOUS ONCE
Status: COMPLETED | OUTPATIENT
Start: 2025-02-17 | End: 2025-02-17

## 2025-02-17 RX ORDER — CALCIUM GLUCONATE 20 MG/ML
2000 INJECTION, SOLUTION INTRAVENOUS ONCE
Status: COMPLETED | OUTPATIENT
Start: 2025-02-17 | End: 2025-02-17

## 2025-02-17 RX ADMIN — HEPARIN SODIUM 5000 UNITS: 5000 INJECTION INTRAVENOUS; SUBCUTANEOUS at 08:55

## 2025-02-17 RX ADMIN — CALCIUM GLUCONATE 2000 MG: 20 INJECTION, SOLUTION INTRAVENOUS at 07:24

## 2025-02-17 RX ADMIN — HEPARIN SODIUM 5000 UNITS: 5000 INJECTION INTRAVENOUS; SUBCUTANEOUS at 20:44

## 2025-02-17 RX ADMIN — ACETAMINOPHEN 650 MG: 325 TABLET ORAL at 17:42

## 2025-02-17 RX ADMIN — MAGNESIUM SULFATE IN WATER 4 G: 40 INJECTION, SOLUTION INTRAVENOUS at 08:56

## 2025-02-17 RX ADMIN — SODIUM CHLORIDE, SODIUM LACTATE, POTASSIUM CHLORIDE, CALCIUM CHLORIDE 250 ML/HR: 20; 30; 600; 310 INJECTION, SOLUTION INTRAVENOUS at 12:05

## 2025-02-17 RX ADMIN — SODIUM CHLORIDE, SODIUM LACTATE, POTASSIUM CHLORIDE, CALCIUM CHLORIDE 250 ML/HR: 20; 30; 600; 310 INJECTION, SOLUTION INTRAVENOUS at 06:26

## 2025-02-17 RX ADMIN — ACETAMINOPHEN 650 MG: 325 TABLET ORAL at 07:24

## 2025-02-17 RX ADMIN — MUPIROCIN 1 APPLICATION: 20 OINTMENT TOPICAL at 20:44

## 2025-02-17 RX ADMIN — SODIUM CHLORIDE 2000 MG: 9 INJECTION INTRAMUSCULAR; INTRAVENOUS; SUBCUTANEOUS at 20:44

## 2025-02-17 RX ADMIN — Medication 10 ML: at 20:45

## 2025-02-17 RX ADMIN — MUPIROCIN 1 APPLICATION: 20 OINTMENT TOPICAL at 08:55

## 2025-02-17 RX ADMIN — TAMSULOSIN HYDROCHLORIDE 0.4 MG: 0.4 CAPSULE ORAL at 08:55

## 2025-02-17 RX ADMIN — Medication 10 ML: at 08:56

## 2025-02-17 RX ADMIN — SODIUM CHLORIDE, SODIUM LACTATE, POTASSIUM CHLORIDE, CALCIUM CHLORIDE 250 ML/HR: 20; 30; 600; 310 INJECTION, SOLUTION INTRAVENOUS at 00:53

## 2025-02-17 RX ADMIN — ACETAMINOPHEN 650 MG: 325 TABLET ORAL at 00:51

## 2025-02-17 NOTE — PLAN OF CARE
Goal Outcome Evaluation:  Plan of Care Reviewed With: patient        Progress: improving             Pt assessment per flowsheet. Medications per MAR. PT Tmax 101, tylenol given and Ice packs applied. Pt remains on RA. No acute events through night. LR infusing per orders, lungs clear. Labs reviewed. Plan of care ongoing.

## 2025-02-17 NOTE — PLAN OF CARE
Goal Outcome Evaluation:           Progress: improving  Outcome Evaluation: Minmal complaints throughout shift. Tmax this shift 100.8. PRN tylenol given twice and ice packs applied. Large volume of urine output. See I&O flowsheet. Clear liquid diet started per Dr. Ann. Per Dr. Ann, if tolerates clear liquid diet well, can begin to reduce amount of LR/hr. PD RN

## 2025-02-17 NOTE — PROGRESS NOTES
[facility]  Inpatient Progress Note    Reason for Follow Up: Acute pancreatitis    Subjective     Interval History:   Patient is doing fair.  He still having some abdominal pain.  He says he still not hungry and does not want to eat anything.  There is no history of vomiting.  He is still n.p.o.    Current Facility-Administered Medications:     acetaminophen (TYLENOL) tablet 650 mg, 650 mg, Oral, Q6H PRN, Elvira Majano APRN, 650 mg at 02/17/25 0724    sennosides-docusate (PERICOLACE) 8.6-50 MG per tablet 2 tablet, 2 tablet, Oral, BID PRN **AND** polyethylene glycol (MIRALAX) packet 17 g, 17 g, Oral, Daily PRN **AND** bisacodyl (DULCOLAX) EC tablet 5 mg, 5 mg, Oral, Daily PRN **AND** bisacodyl (DULCOLAX) suppository 10 mg, 10 mg, Rectal, Daily PRN, Dioni Chiu MD    cefTRIAXone (ROCEPHIN) in NS 2 GRAMS/20ml IV PUSH syringe, 2,000 mg, Intravenous, Q24H, Elvira Majano APRN, 2,000 mg at 02/16/25 2026    dextrose (D50W) (25 g/50 mL) IV injection 25 g, 25 g, Intravenous, Q15 Min PRN, Elvira Majano APRN    dextrose (GLUTOSE) oral gel 15 g, 15 g, Oral, Q15 Min PRN, Elvira Majano APRN    glucagon (GLUCAGEN) injection 1 mg, 1 mg, Intramuscular, Q15 Min PRN, Elvira Majano APRN    heparin (porcine) 5000 UNIT/ML injection 5,000 Units, 5,000 Units, Subcutaneous, Q12H, Dioni Chiu MD, 5,000 Units at 02/17/25 0855    insulin regular (humuLIN R,novoLIN R) injection 2-7 Units, 2-7 Units, Subcutaneous, Q6H, Elvira Majano APRN    lactated ringers infusion, 250 mL/hr, Intravenous, Continuous, Uriel Salas MD, Last Rate: 250 mL/hr at 02/17/25 1205, 250 mL/hr at 02/17/25 1205    morphine injection 2 mg, 2 mg, Intravenous, Q4H PRN, 2 mg at 02/16/25 2046 **AND** naloxone (NARCAN) injection 0.4 mg, 0.4 mg, Intravenous, Q5 Min PRN, Dioni Chiu MD    morphine injection 4 mg, 4 mg, Intravenous, Q4H PRN, 4 mg at 02/16/25 0898 **AND** naloxone (NARCAN) injection 0.4  mg, 0.4 mg, Intravenous, Q5 Min PRN, Dioni Chiu MD    mupirocin (BACTROBAN) 2 % nasal ointment 1 Application, 1 Application, Each Nare, BID, Uriel Salas MD, 1 Application at 02/17/25 0855    nitroglycerin (NITROSTAT) SL tablet 0.4 mg, 0.4 mg, Sublingual, Q5 Min PRN, Dioni Chiu MD    ondansetron ODT (ZOFRAN-ODT) disintegrating tablet 4 mg, 4 mg, Oral, Q6H PRN **OR** ondansetron (ZOFRAN) injection 4 mg, 4 mg, Intravenous, Q6H PRN, Dioni Chiu MD, 4 mg at 02/16/25 2046    sodium chloride 0.9 % flush 10 mL, 10 mL, Intravenous, PRN, Manpreet Barnard MD    sodium chloride 0.9 % flush 10 mL, 10 mL, Intravenous, Q12H, Dioni Chiu MD, 10 mL at 02/17/25 0856    sodium chloride 0.9 % flush 10 mL, 10 mL, Intravenous, PRN, Dioni Chiu MD    sodium chloride 0.9 % infusion 40 mL, 40 mL, Intravenous, PRN, Dioni Chiu MD    tamsulosin (FLOMAX) 24 hr capsule 0.4 mg, 0.4 mg, Oral, Daily, Elvira Majano Sylvia, APRN, 0.4 mg at 02/17/25 0855  Review of Systems:    All system ROS is negative except what's mentioned in HPI.    Objective     Vital Signs  Temp:  [99.8 °F (37.7 °C)-100.9 °F (38.3 °C)] 100.5 °F (38.1 °C)  Heart Rate:  [] 97  Resp:  [16-24] 18  BP: (102-149)/(59-89) 136/69  Body mass index is 28.08 kg/m².    Intake/Output Summary (Last 24 hours) at 2/17/2025 1512  Last data filed at 2/17/2025 1500  Gross per 24 hour   Intake 5098 ml   Output 4250 ml   Net 848 ml     I/O this shift:  In: -   Out: 2725 [Urine:2725]     Physical Exam:  General     Alert and oriented, normal affect   Head:    Normocephalic, without obvious abnormality, atraumatic   Eyes:          conjunctivae and sclerae normal, no icterus, pupils round and reactive to light   Oral cavity: Moist and intact, normal dentation   Neck:   Supple, no adenopathy   Chest Wall/Lungs:    No abnormalities observed, equal on expansion bilaterally, no use of extrarespiratory muscles noted   Abdomen:     Soft, nondistended, nontender; normal bowel  sounds, no hepatospleenomegaly   Extremities:   no edema, clubbing, or cyanosis   Skin:   No bruising or rash   Psychiatric:  normal mood and insight          Results Review:      Results from last 7 days   Lab Units 02/17/25  0247 02/16/25 0756 02/16/25  0019   WBC 10*3/mm3 25.76* 21.18* 17.34*   HEMOGLOBIN g/dL 15.1 17.5 18.3*   HEMATOCRIT % 45.2 52.6* 56.0*   PLATELETS 10*3/mm3 227 290 300     Results from last 7 days   Lab Units 02/17/25  0247 02/16/25  0756 02/16/25  0019   SODIUM mmol/L 134* 137 138   POTASSIUM mmol/L 3.9 4.5 4.6   CHLORIDE mmol/L 102 100 102   CO2 mmol/L 24.8 23.5 24.3   BUN mg/dL 11 17 19   CREATININE mg/dL 0.66* 1.01 1.00   CALCIUM mg/dL 8.8 9.1 9.8   BILIRUBIN mg/dL  --   --  1.0   ALK PHOS U/L  --   --  159*   ALT (SGPT) U/L  --   --  204*   AST (SGOT) U/L  --   --  152*   GLUCOSE mg/dL 125* 148* 199*         Lab Results   Lab Value Date/Time    LIPASE 1,534 (H) 02/16/2025 0019         Assessment & Plan   Assessment:   Acute pancreatitis with the noninfected necrosis etiology unclear.      Plan:     Will start patient clear liquids and see if he can tolerate clear liquids.  Will also check the results of the MRCP to make sure patient does not have bile duct stones.  Will check LFTs in the morning.  I discussed the patients findings and my recommendations with patient and the nursing staff..    Anige Ann MD

## 2025-02-17 NOTE — PROGRESS NOTES
Pulmonary / Critical Care Progress Note      Patient Name: Kevin Reyes  : 1970  MRN: [id number]  Attending:  Uriel Salas MD   Date of admission: 2025    Subjective   Subjective   Patient critically ill with acute pancreatitis    Over past 24 hours:  Patient received IV fluid resuscitation  Had urinary retention, Reese catheter placed  Abdominal ultrasound done  Tmax 100.9       Objective   Objective     Vitals:   Vital signs for last 24 hours:  Temp:  [99.3 °F (37.4 °C)-100.9 °F (38.3 °C)] 100.7 °F (38.2 °C)  Heart Rate:  [] 96  Resp:  [16-24] 22  BP: (102-153)/(59-93) 146/78    Intake/Output last 3 shifts:  I/O last 3 completed shifts:  In: 6463.8 [I.V.:6463.8]  Out: 3175 [Urine:3175]    Physical Exam   Vital Signs Reviewed   General:  WDWN, Alert, NAD.    HEENT:  PERRL, EOMI.  OP, nares clear  Chest:  good aeration, clear to auscultation bilaterally, tympanic to percussion bilaterally, no work of breathing noted  CV: RRR, no MGR, pulses 2+, equal.  Abd:  Soft, mild distention, abdominal tenderness noted, + BS, no HSM  EXT:  no clubbing, no cyanosis, no edema  Neuro:  A&Ox3, CN grossly intact, no focal deficits.  Skin: No rashes or lesions noted      Result Review    Result Review:  I have personally reviewed the results from the time of this admission to 2025 09:48 EST and agree with these findings:  [x]  Laboratory  [x]  Microbiology  [x]  Radiology  [x]  EKG/Telemetry   []  Cardiology/Vascular   []  Pathology  []  Old records  []  Other:  Most notable findings include:       Lab 25  0247 25  0756 25  0019   WBC 25.76* 21.18* 17.34*   HEMOGLOBIN 15.1 17.5 18.3*   HEMATOCRIT 45.2 52.6* 56.0*   PLATELETS 227 290 300   SODIUM 134* 137 138   POTASSIUM 3.9 4.5 4.6   CHLORIDE 102 100 102   CO2 24.8 23.5 24.3   BUN 11 17 19   CREATININE 0.66* 1.01 1.00   GLUCOSE 125* 148* 199*   CALCIUM 8.8 9.1 9.8   TOTAL PROTEIN  --   --  8.4   ALBUMIN  --   --  4.2   GLOBULIN  --    --  4.2     US Abdomen Complete    Result Date: 2/16/2025  Impression: 1.Gallbladder appears within normal limits. 2.Hepatic steatosis. 3.Pancreas not well seen, better characterized on recent prior CT. Electronically Signed: Ricci Weeks MD  2/16/2025 3:30 PM EST  Workstation ID: [id number]    CT Abdomen Pelvis With Contrast    Result Date: 2/16/2025  1.Acute pancreatitis. 2.There is an area of poor enhancement or nonenhancement involving the proximal pancreatic body and a portion of the pancreatic head. It is unclear if this is pancreatic necrosis or pancreatic mass. This could be better assessed with nonemergent MRI with  contrast. 3.There is wall thickening and wall enhancement involving the proximal duodenum, and there is surrounding fluid. This is probably reactive duodenitis from the pancreatitis. 4.Hepatic steatosis. 5.Massively distended urinary bladder with thickening of the bladder wall. Findings could reflect a chronic bladder outlet obstruction with chronic cystitis. 6.Extensive atherosclerotic disease as described above. 7.Additional chronic findings as described above. Electronically Signed: Bijan Patel MD  2/16/2025 1:41 AM EST  Workstation ID: [id number]       Assessment & Plan   Assessment / Plan     Active Hospital Problems:  Active Hospital Problems    Diagnosis    • **Pancreatitis    • Lactic acidosis    • Transaminitis    • Leukocytosis      Impression:  Severe sepsis, present on admission  Acute pancreatitis of unknown etiology  Gram-negative francis urinary tract infection  Lactic acidosis, clinically significant  Acute urinary retention  Dehydration  Severe abdominal pain secondary to pancreatitis  Hyponatremia, clinically insignificant  Hypocalcemia  Leukocytosis  Hypomagnesemia  Hypokalemia    Plan:  -Unknown etiology of pancreatitis.  Agree with MRCP today  -Continue  mL/h  -Appreciate GI input.  IgG4 pending to workup autoimmune pancreatitis  -Trend liver enzymes  -Trend renal  panel electrolytes.  Replace magnesium and calcium IV  -Rocephin for UTI, de-escalate based off cultures  -Reese catheter for now.  Continue tamsulosin  -N.p.o. for now  -Continue current narcotic analgesic regimen  -Check [place]'s criteria 48 hours post admission  -Lactic acid cleared    VTE Prophylaxis: heparin  Pharmacologic VTE prophylaxis orders are present.    CODE STATUS:   Code Status (Patient has no pulse and is not breathing): CPR (Attempt to Resuscitate)  Medical Interventions (Patient has pulse or is breathing): Full Support      Patient is critically ill with lactic acidosis, pancreatitis, urinary tract infection, severe sepsis. We have personally reviewed all pertinent labs, imaging, microbiology and documentation. We have discussed care with the primary service as well as at multidisciplinary critical care rounds with the bedside nurse, respiratory therapist, pharmacist and all other ancillary services.40 minutes of critical care time was spent managing this patient, excluding procedures. Of this time, I spent 26 minutes in accordance with split shared billing.    I, KELSI Aldana, spent 14 minutes critical care time.  Electronically signed by KELSI Decker, 02/17/25, 12:13 PM EST.    Electronically signed by Shyam Stallings MD, 02/17/25, 1:45 PM EST.

## 2025-02-17 NOTE — PROGRESS NOTES
[facility]   Hospitalist Progress Note  Date: 2025  Patient Name: Kevin Reyes  : 1970  MRN: [id number]  Date of admission: 2025  Room/Bed: [facility] [place]      Subjective   Subjective     Chief Complaint: Abdominal pain    Summary:Kevin Reyes is a 54 y.o. male who presents with abdominal pain.  Found to have pancreatitis.  Patient with worsening lactic acidosis.  Worsening leukocytosis.  GI consulted.  Patient transferring to ICU.  Found to have gram-negative UTI.  Started on ceftriaxone.  Patient's pain is improving.  MRCP ordered.     Interval Followup:   Patient seen in ICU.  He says his pain is a lot better.    All systems reviewed and negative except for what is outlined above.      Objective   Objective     Vitals:   Temp:  [99.8 °F (37.7 °C)-100.9 °F (38.3 °C)] 100.5 °F (38.1 °C)  Heart Rate:  [] 97  Resp:  [16-24] 18  BP: (102-151)/(59-90) 136/69    Physical Exam   General: No acute distress  Cardiovascular: Normal S1, S2  Pulmonary: Normal work of breathing  Gastrointestinal: Soft and nontender      Result Review    Result Review:  I have personally reviewed these results:  [x]  Laboratory      Lab 25  0247 25  2253 25  1659 25  1054 25  0756 25  0426 25  0019   WBC 25.76*  --   --   --  21.18*  --  17.34*   HEMOGLOBIN 15.1  --   --   --  17.5  --  18.3*   HEMATOCRIT 45.2  --   --   --  52.6*  --  56.0*   PLATELETS 227  --   --   --  290  --  300   NEUTROS ABS 22.29*  --   --   --  18.59*  --  15.24*   IMMATURE GRANS (ABS) 0.11*  --   --   --  0.09*  --  0.07*   LYMPHS ABS 1.49  --   --   --  1.26  --  1.16   MONOS ABS 1.79*  --   --   --  1.19*  --  0.76   EOS ABS 0.03  --   --   --  0.01  --  0.02   MCV 88.6  --   --   --  89.3  --  90.0   PROCALCITONIN  --   --   --   --  0.32*  --   --    LACTATE  --  1.9 2.2* 4.1* 3.5*   < > 3.7*   LDH  --   --   --   --  255*  --   --     < > = values in this interval not displayed.          Lab 02/17/25  0247 02/16/25  0756 02/16/25  0019   SODIUM 134* 137 138   POTASSIUM 3.9 4.5 4.6   CHLORIDE 102 100 102   CO2 24.8 23.5 24.3   ANION GAP 7.2 13.5 11.7   BUN 11 17 19   CREATININE 0.66* 1.01 1.00   EGFR 111.5 88.4 89.4   GLUCOSE 125* 148* 199*   CALCIUM 8.8 9.1 9.8   MAGNESIUM 1.6  --   --          Lab 02/16/25  0019   TOTAL PROTEIN 8.4   ALBUMIN 4.2   GLOBULIN 4.2   ALT (SGPT) 204*   AST (SGOT) 152*   BILIRUBIN 1.0   ALK PHOS 159*   LIPASE 1,534*             Lab 02/16/25  0019   TRIGLYCERIDES 241*             Brief Urine Lab Results  (Last result in the past 365 days)        Color   Clarity   Blood   Leuk Est   Nitrite   Protein   CREAT   Urine HCG        02/16/25 2055 Yellow   Slightly Cloudy   Small (1+)   Small (1+)   Negative   100 mg/dL (2+)                 [x]  Microbiology   Microbiology Results (last 10 days)       Procedure Component Value - Date/Time    Urine Culture - Urine, Indwelling Urethral Catheter [[id number]]  (Abnormal) Collected: 02/16/25 1717    Lab Status: Preliminary result Specimen: Urine from Indwelling Urethral Catheter Updated: 02/17/25 1209     Urine Culture 50,000 CFU/mL Gram Negative Bacilli    Narrative:      Colonization of the urinary tract without infection is common. Treatment is discouraged unless the patient is symptomatic, pregnant, or undergoing an invasive urologic procedure.          [x]  Radiology  US Abdomen Complete    Result Date: 2/16/2025  Impression: 1.Gallbladder appears within normal limits. 2.Hepatic steatosis. 3.Pancreas not well seen, better characterized on recent prior CT. Electronically Signed: Ricci Weeks MD  2/16/2025 3:30 PM EST  Workstation ID: [id number]    CT Abdomen Pelvis With Contrast    Result Date: 2/16/2025  1.Acute pancreatitis. 2.There is an area of poor enhancement or nonenhancement involving the proximal pancreatic body and a portion of the pancreatic head. It is unclear if this is pancreatic necrosis or pancreatic mass.  This could be better assessed with nonemergent MRI with  contrast. 3.There is wall thickening and wall enhancement involving the proximal duodenum, and there is surrounding fluid. This is probably reactive duodenitis from the pancreatitis. 4.Hepatic steatosis. 5.Massively distended urinary bladder with thickening of the bladder wall. Findings could reflect a chronic bladder outlet obstruction with chronic cystitis. 6.Extensive atherosclerotic disease as described above. 7.Additional chronic findings as described above. Electronically Signed: Bijan Patel MD  2/16/2025 1:41 AM EST  Workstation ID: [id number]   []  EKG/Telemetry   []  Cardiology/Vascular   []  Pathology  []  Old records  []  Other:    Assessment & Plan        Assessment and Plan:    #Pancreatitis  LR at 250 cc/h  GI consulted, appreciate recommendations  IV morphine for pain control as needed  N.p.o.  Follow-up MRCP    #Gram-negative UTI  Continue ceftriaxone    Rest of care per ICU team     Discussed with RN.    VTE Prophylaxis:  Pharmacologic VTE prophylaxis orders are present.        CODE STATUS:   Code Status (Patient has no pulse and is not breathing): CPR (Attempt to Resuscitate)  Medical Interventions (Patient has pulse or is breathing): Full Support      Electronically signed by Uriel Salas MD, 2/17/2025, 13:25 EST.

## 2025-02-17 NOTE — THERAPY EVALUATION
Patient Name: Kevin Reyes  : 1970    MRN: [id number]                              Today's Date: 2025       Admit Date: 2025    Visit Dx:     ICD-10-CM ICD-9-CM   1. Idiopathic acute pancreatitis, unspecified complication status  K85.00 577.0   2. Pain of upper abdomen  R10.10 789.09   3. 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      General Information       Row Name 25 1534          OT Time and Intention    Document Type evaluation  -     Mode of Treatment individual therapy;occupational therapy  -       Row Name 25 1534          General Information    Patient Profile Reviewed yes  -     Prior Level of Function --  (I) with ADLs, is non-ambulatory, (I) with functional transfers, self-propels w/c for functional mobility, has a step over tub that he kneels within to bathe, has an elevated commode, sits to groom, and no home O2.  -     Existing Precautions/Restrictions fall  -     Barriers to Rehab none identified  -       Row Name 25 1534          Occupational Profile    Reason for Services/Referral (Occupational Profile) Patient is a 54 year old male admitted to [facility] for abdominal pain on 2025. Occupational therapy consulted due to recent decline in ADLs/functional transfers. No previous occupational therapy services for current condition.  -       Row Name 25 1534          Living Environment    People in Home parent(s)  mother  -       Row Name 25 1534          Stairs Within Home, Primary    Number of Stairs, Within Home, Primary none  -       Row Name 25 1534          Cognition     Orientation Status (Cognition) oriented x 3  -       Row Name 02/17/25 1534          Safety Issues/Impairments Affecting Functional Mobility    Impairments Affecting Function (Mobility) balance;endurance/activity tolerance  -               User Key  (r) = Recorded By, (t) = Taken By, (c) = Cosigned By      Initials Name Provider Type     Amie Selby OT Occupational Therapist                     Mobility/ADL's       Row Name 02/17/25 1543          Bed Mobility    Bed Mobility supine-sit;sit-supine  -     Supine-Sit Reva (Bed Mobility) standby assist  -     Sit-Supine Reva (Bed Mobility) standby assist  -     Bed Mobility, Safety Issues decreased use of legs for bridging/pushing  -     Assistive Device (Bed Mobility) bed rails  -     Comment, (Bed Mobility) Further functional transfers deferred d/t being non-ambulatory at baseline.  -[facility] Name 02/17/25 1543          Activities of Daily Living    BADL Assessment/Intervention bathing;upper body dressing;lower body dressing;grooming;feeding;toileting  -[facility] Name 02/17/25 1543          Bathing Assessment/Intervention    Reva Level (Bathing) bathing skills;upper body;set up;lower body;maximum assist (25% patient effort)  -       Row Name 02/17/25 1543          Upper Body Dressing Assessment/Training    Reva Level (Upper Body Dressing) upper body dressing skills;set up  -[facility] Name 02/17/25 1543          Lower Body Dressing Assessment/Training    Reva Level (Lower Body Dressing) lower body dressing skills;maximum assist (25% patient effort)  -[facility] Name 02/17/25 1543          Grooming Assessment/Training    Reva Level (Grooming) grooming skills;set up  -[facility] Name 02/17/25 1543          Self-Feeding Assessment/Training    Reva Level (Feeding) feeding skills;set up  -[facility] Name 02/17/25 1543          Toileting Assessment/Training    Reva Level  (Toileting) toileting skills;maximum assist (25% patient effort)  -LF               User Key  (r) = Recorded By, (t) = Taken By, (c) = Cosigned By      Initials Name Provider Type    LF Amie Selby OT Occupational Therapist                   Obj/Interventions       Row Name 02/17/25 1546          Sensory Assessment (Somatosensory)    Sensory Assessment (Somatosensory) UE sensation intact  -[facility] Name 02/17/25 1546          Vision Assessment/Intervention    Visual Impairment/Limitations WFL  -LF       Row Name 02/17/25 1546          Range of Motion Comprehensive    General Range of Motion bilateral upper extremity ROM WFL  -LF       [facility] Name 02/17/25 1546          Strength Comprehensive (MMT)    Comment, General Manual Muscle Testing (MMT) Assessment 5/5 BUEs  -[facility] Name 02/17/25 1546          Motor Skills    Motor Skills coordination;functional endurance  -LF     Coordination bilateral;upper extremity;WFL  -LF     Functional Endurance Fair  -LF       [facility] Name 02/17/25 1546          Balance    Balance Assessment sitting dynamic balance  -LF     Dynamic Sitting Balance standby assist  -LF     Position, Sitting Balance unsupported;sitting edge of bed  -               User Key  (r) = Recorded By, (t) = Taken By, (c) = Cosigned By      Initials Name Provider Type    LF Amie Selby OT Occupational Therapist                   Goals/Plan       Row Name 02/17/25 1547          Bed Mobility Goal 1 (OT)    Activity/Assistive Device (Bed Mobility Goal 1, OT) bed mobility activities, all  -LF     [place] Level/Cues Needed (Bed Mobility Goal 1, OT) modified independence  -LF     Time Frame (Bed Mobility Goal 1, OT) long term goal (LTG);10 days  -[facility] Name 02/17/25 1547          Transfer Goal 1 (OT)    Activity/Assistive Device (Transfer Goal 1, OT) transfers, all  -LF     [place] Level/Cues Needed (Transfer Goal 1, OT) modified independence  -LF     Time Frame (Transfer Goal 1, OT)  long term goal (LTG);10 days  -       Row Name 02/17/25 1547          Bathing Goal 1 (OT)    Activity/Device (Bathing Goal 1, OT) bathing skills, all  -LF     [place] Level/Cues Needed (Bathing Goal 1, OT) modified independence  -LF     Time Frame (Bathing Goal 1, OT) long term goal (LTG);10 days  -       Row Name 02/17/25 1547          Dressing Goal 1 (OT)    Activity/Device (Dressing Goal 1, OT) dressing skills, all  -LF     [place]/Cues Needed (Dressing Goal 1, OT) modified independence  -LF     Time Frame (Dressing Goal 1, OT) long term goal (LTG);10 days  -[facility] Name 02/17/25 1547          Toileting Goal 1 (OT)    Activity/Device (Toileting Goal 1, OT) toileting skills, all  -LF     [place] Level/Cues Needed (Toileting Goal 1, OT) modified independence  -LF     Time Frame (Toileting Goal 1, OT) long term goal (LTG);10 days  -[facility] Name 02/17/25 1547          Problem Specific Goal 1 (OT)    Problem Specific Goal 1 (OT) Patient will demonstrate good endurance to support ADLs/functional transfers.  -LF     Time Frame (Problem Specific Goal 1, OT) long term goal (LTG);10 days  -[facility] Name 02/17/25 1547          Therapy Assessment/Plan (OT)    Planned Therapy Interventions (OT) activity tolerance training;BADL retraining;functional balance retraining;occupation/activity based interventions;patient/caregiver education/training;strengthening exercise;transfer/mobility retraining  -               User Key  (r) = Recorded By, (t) = Taken By, (c) = Cosigned By      Initials Name Provider Type     Amie Selby OT Occupational Therapist                   Clinical Impression       Row Name 02/17/25 1546          Pain Assessment    Additional Documentation Pain Scale: FACES Pre/Post-Treatment (Group)  -[facility] Name 02/17/25 1546          Pain Scale: FACES Pre/Post-Treatment    Pain: FACES Scale, Pretreatment 2-->hurts little bit  -LF     Posttreatment Pain Rating  2-->hurts little bit  -       Row Name 02/17/25 1546          Plan of Care Review    Plan of Care Reviewed With patient  -LF     Progress no change  -     Outcome Evaluation Patient presents with limitations in self-care, functional transfers, balance, and endurance. He would benefit from continued skilled occupational therapy services to maximize independence with ADLs/functional transfers.  -       Row Name 02/17/25 1546          Therapy Assessment/Plan (OT)    Patient/Family Therapy Goal Statement (OT) To maximize independence.  -LF     Rehab Potential (OT) good  -LF     Criteria for Skilled Therapeutic Interventions Met (OT) yes;meets criteria;skilled treatment is necessary  -     Therapy Frequency (OT) 5 times/wk  -       Row Name 02/17/25 1546          Therapy Plan Review/Discharge Plan (OT)    Anticipated Discharge Disposition (OT) sub acute care setting  -       Row Name 02/17/25 1546          Vital Signs    O2 Delivery Pre Treatment room air  -LF     O2 Delivery Intra Treatment room air  -LF     O2 Delivery Post Treatment room air  -       Row Name 02/17/25 1546          Positioning and Restraints    Pre-Treatment Position in bed  -LF     Post Treatment Position bed  -LF     In Bed fowlers;call light within reach;encouraged to call for assist;exit alarm on  -LF               User Key  (r) = Recorded By, (t) = Taken By, (c) = Cosigned By      Initials Name Provider Type     Amie Selby, OT Occupational Therapist                   Outcome Measures       Row Name 02/17/25 1540          How much help from another is currently needed...    Putting on and taking off regular lower body clothing? 2  -LF     Bathing (including washing, rinsing, and drying) 2  -LF     Toileting (which includes using toilet bed pan or urinal) 2  -LF     Putting on and taking off regular upper body clothing 4  -LF     Taking care of personal grooming (such as brushing teeth) 4  -LF     Eating meals 4  -LF      AM-PAC 6 Clicks Score (OT) 18  -LF       Row Name 02/17/25 0738          How much help from another person do you currently need...    Turning from your back to your side while in flat bed without using bedrails? 4  -PD     Moving from lying on back to sitting on the side of a flat bed without bedrails? 4  -PD     Moving to and from a bed to a chair (including a wheelchair)? 3  -PD     Standing up from a chair using your arms (e.g., wheelchair, bedside chair)? 3  -PD     Climbing 3-5 steps with a railing? 2  -PD     To walk in hospital room? 2  -PD     AM-PAC 6 Clicks Score (PT) 18  -PD       Row Name 02/17/25 1547          Functional Assessment    Outcome Measure Options AM-PAC 6 Clicks Daily Activity (OT);Optimal Instrument  -LF       Row Name 02/17/25 1547          Optimal Instrument    Optimal Instrument Optimal - 3  -LF     Bending/Stooping 4  -LF     Standing 5  -LF     Reaching 1  -LF     From the list, choose the 3 activities you would most like to be able to do without any difficulty Bending/stooping;Standing;Reaching  -LF     Total Score Optimal - 3 10  -LF               User Key  (r) = Recorded By, (t) = Taken By, (c) = Cosigned By      Initials Name Provider Type     Amie Selby OT Occupational Therapist    Shelly Ansari, RN Registered Nurse                    Occupational Therapy Education       Title: PT OT SLP Therapies (In Progress)       Topic: Occupational Therapy (In Progress)       Point: ADL training (In Progress)       Description:   Instruct learner(s) on proper safety adaptation and remediation techniques during self care or transfers.   Instruct in proper use of assistive devices.                  Learning Progress Summary            Patient Acceptance, E,TB, NR by  at 2/17/2025 8174                      Point: Precautions (In Progress)       Description:   Instruct learner(s) on prescribed precautions during self-care and functional transfers.                  Learning  Progress Summary            Patient Acceptance, E,TB, NR by  at 2/17/2025 1548                      Point: Body mechanics (In Progress)       Description:   Instruct learner(s) on proper positioning and spine alignment during self-care, functional mobility activities and/or exercises.                  Learning Progress Summary            Patient Acceptance, E,TB, NR by  at 2/17/2025 1548                                      User Key       Initials Effective Dates Name Provider Type Discipline     06/16/21 -  Amie Selby, OT Occupational Therapist OT                  OT Recommendation and Plan  Planned Therapy Interventions (OT): activity tolerance training, BADL retraining, functional balance retraining, occupation/activity based interventions, patient/caregiver education/training, strengthening exercise, transfer/mobility retraining  Therapy Frequency (OT): 5 times/wk  Plan of Care Review  Plan of Care Reviewed With: patient  Progress: no change  Outcome Evaluation: Patient presents with limitations in self-care, functional transfers, balance, and endurance. He would benefit from continued skilled occupational therapy services to maximize independence with ADLs/functional transfers.     Time Calculation:   Evaluation Complexity (OT)  Review Occupational Profile/Medical/Therapy History Complexity: brief/low complexity  Assessment, Occupational Performance/Identification of Deficit Complexity: 3-5 performance deficits  Clinical Decision Making Complexity (OT): problem focused assessment/low complexity  Overall Complexity of Evaluation (OT): low complexity     Time Calculation- OT       Row Name 02/17/25 1548             Time Calculation- OT    OT Received On 02/17/25  -LF      OT Goal Re-Cert Due Date 02/26/25  -LF         Untimed Charges    OT Eval/Re-eval Minutes 35  -LF         Total Minutes    Untimed Charges Total Minutes 35  -LF       Total Minutes 35  -LF                User Key  (r) = Recorded By,  (t) = Taken By, (c) = Cosigned By      Initials Name Provider Type     Amie Selby, BILL Occupational Therapist                  Therapy Charges for Today       Code Description Service Date Service Provider Modifiers Qty    [id number]  OT EVAL LOW COMPLEXITY 3 2/17/2025 Amie Selby OT GO 1                 Amie Selby OT  2/17/2025

## 2025-02-17 NOTE — PLAN OF CARE
Goal Outcome Evaluation:  Plan of Care Reviewed With: patient        Progress: no change  Outcome Evaluation: Patient presents with limitations in self-care, functional transfers, balance, and endurance. He would benefit from continued skilled occupational therapy services to maximize independence with ADLs/functional transfers.    Anticipated Discharge Disposition (OT): sub acute care setting

## 2025-02-18 LAB
ALBUMIN SERPL-MCNC: 3 G/DL (ref 3.5–5.2)
ALBUMIN/GLOB SERPL: 0.8 G/DL
ALP SERPL-CCNC: 108 U/L (ref 39–117)
ALT SERPL W P-5'-P-CCNC: 57 U/L (ref 1–41)
ANION GAP SERPL CALCULATED.3IONS-SCNC: 11.2 MMOL/L (ref 5–15)
AST SERPL-CCNC: 35 U/L (ref 1–40)
BASOPHILS # BLD AUTO: 0.07 10*3/MM3 (ref 0–0.2)
BASOPHILS NFR BLD AUTO: 0.3 % (ref 0–1.5)
BILIRUB SERPL-MCNC: 1.3 MG/DL (ref 0–1.2)
BUN SERPL-MCNC: 9 MG/DL (ref 6–20)
BUN/CREAT SERPL: 13.4 (ref 7–25)
CALCIUM SPEC-SCNC: 8.8 MG/DL (ref 8.6–10.5)
CHLORIDE SERPL-SCNC: 101 MMOL/L (ref 98–107)
CO2 SERPL-SCNC: 20.8 MMOL/L (ref 22–29)
CREAT SERPL-MCNC: 0.67 MG/DL (ref 0.76–1.27)
DEPRECATED RDW RBC AUTO: 46 FL (ref 37–54)
EGFRCR SERPLBLD CKD-EPI 2021: 111 ML/MIN/1.73
EOSINOPHIL # BLD AUTO: 0.15 10*3/MM3 (ref 0–0.4)
EOSINOPHIL NFR BLD AUTO: 0.7 % (ref 0.3–6.2)
ERYTHROCYTE [DISTWIDTH] IN BLOOD BY AUTOMATED COUNT: 13.5 % (ref 12.3–15.4)
GLOBULIN UR ELPH-MCNC: 3.8 GM/DL
GLUCOSE BLDC GLUCOMTR-MCNC: 101 MG/DL (ref 70–99)
GLUCOSE BLDC GLUCOMTR-MCNC: 102 MG/DL (ref 70–99)
GLUCOSE BLDC GLUCOMTR-MCNC: 103 MG/DL (ref 70–99)
GLUCOSE BLDC GLUCOMTR-MCNC: 104 MG/DL (ref 70–99)
GLUCOSE BLDC GLUCOMTR-MCNC: 125 MG/DL (ref 70–99)
GLUCOSE BLDC GLUCOMTR-MCNC: 92 MG/DL (ref 70–99)
GLUCOSE SERPL-MCNC: 93 MG/DL (ref 65–99)
HCT VFR BLD AUTO: 45.1 % (ref 37.5–51)
HGB BLD-MCNC: 14.4 G/DL (ref 13–17.7)
IGG4 SER-MCNC: 130 MG/DL (ref 2–96)
IMM GRANULOCYTES # BLD AUTO: 0.14 10*3/MM3 (ref 0–0.05)
IMM GRANULOCYTES NFR BLD AUTO: 0.7 % (ref 0–0.5)
LYMPHOCYTES # BLD AUTO: 1.63 10*3/MM3 (ref 0.7–3.1)
LYMPHOCYTES NFR BLD AUTO: 7.9 % (ref 19.6–45.3)
MAGNESIUM SERPL-MCNC: 2.3 MG/DL (ref 1.6–2.6)
MCH RBC QN AUTO: 29.5 PG (ref 26.6–33)
MCHC RBC AUTO-ENTMCNC: 31.9 G/DL (ref 31.5–35.7)
MCV RBC AUTO: 92.4 FL (ref 79–97)
MONOCYTES # BLD AUTO: 1.53 10*3/MM3 (ref 0.1–0.9)
MONOCYTES NFR BLD AUTO: 7.4 % (ref 5–12)
NEUTROPHILS NFR BLD AUTO: 17.08 10*3/MM3 (ref 1.7–7)
NEUTROPHILS NFR BLD AUTO: 83 % (ref 42.7–76)
NRBC BLD AUTO-RTO: 0 /100 WBC (ref 0–0.2)
PHOSPHATE SERPL-MCNC: 1.6 MG/DL (ref 2.5–4.5)
PLATELET # BLD AUTO: 163 10*3/MM3 (ref 140–450)
PMV BLD AUTO: 9.9 FL (ref 6–12)
POTASSIUM SERPL-SCNC: 4.4 MMOL/L (ref 3.5–5.2)
PROT SERPL-MCNC: 6.8 G/DL (ref 6–8.5)
RBC # BLD AUTO: 4.88 10*6/MM3 (ref 4.14–5.8)
SODIUM SERPL-SCNC: 133 MMOL/L (ref 136–145)
WBC NRBC COR # BLD AUTO: 20.6 10*3/MM3 (ref 3.4–10.8)

## 2025-02-18 PROCEDURE — 84100 ASSAY OF PHOSPHORUS: CPT | Performed by: NURSE PRACTITIONER

## 2025-02-18 PROCEDURE — 99233 SBSQ HOSP IP/OBS HIGH 50: CPT | Performed by: INTERNAL MEDICINE

## 2025-02-18 PROCEDURE — 82948 REAGENT STRIP/BLOOD GLUCOSE: CPT

## 2025-02-18 PROCEDURE — 83735 ASSAY OF MAGNESIUM: CPT | Performed by: NURSE PRACTITIONER

## 2025-02-18 PROCEDURE — 25010000002 HEPARIN (PORCINE) PER 1000 UNITS: Performed by: STUDENT IN AN ORGANIZED HEALTH CARE EDUCATION/TRAINING PROGRAM

## 2025-02-18 PROCEDURE — 85025 COMPLETE CBC W/AUTO DIFF WBC: CPT | Performed by: NURSE PRACTITIONER

## 2025-02-18 PROCEDURE — 25810000003 LACTATED RINGERS PER 1000 ML: Performed by: INTERNAL MEDICINE

## 2025-02-18 PROCEDURE — 82948 REAGENT STRIP/BLOOD GLUCOSE: CPT | Performed by: NURSE PRACTITIONER

## 2025-02-18 PROCEDURE — 80053 COMPREHEN METABOLIC PANEL: CPT | Performed by: NURSE PRACTITIONER

## 2025-02-18 PROCEDURE — 25010000002 CEFEPIME PER 500 MG: Performed by: INTERNAL MEDICINE

## 2025-02-18 PROCEDURE — 25010000003 DEXTROSE 5 % SOLUTION: Performed by: PHYSICIAN ASSISTANT

## 2025-02-18 PROCEDURE — 25810000003 LACTATED RINGERS PER 1000 ML: Performed by: STUDENT IN AN ORGANIZED HEALTH CARE EDUCATION/TRAINING PROGRAM

## 2025-02-18 PROCEDURE — 99291 CRITICAL CARE FIRST HOUR: CPT | Performed by: INTERNAL MEDICINE

## 2025-02-18 RX ORDER — BISACODYL 5 MG/1
5 TABLET, DELAYED RELEASE ORAL DAILY PRN
Status: DISCONTINUED | OUTPATIENT
Start: 2025-02-18 | End: 2025-02-20 | Stop reason: HOSPADM

## 2025-02-18 RX ORDER — ASPIRIN 81 MG/1
81 TABLET ORAL DAILY
Status: DISCONTINUED | OUTPATIENT
Start: 2025-02-18 | End: 2025-02-18

## 2025-02-18 RX ORDER — SODIUM CHLORIDE, SODIUM LACTATE, POTASSIUM CHLORIDE, CALCIUM CHLORIDE 600; 310; 30; 20 MG/100ML; MG/100ML; MG/100ML; MG/100ML
75 INJECTION, SOLUTION INTRAVENOUS CONTINUOUS
Status: ACTIVE | OUTPATIENT
Start: 2025-02-18 | End: 2025-02-19

## 2025-02-18 RX ORDER — BISACODYL 10 MG
10 SUPPOSITORY, RECTAL RECTAL DAILY PRN
Status: DISCONTINUED | OUTPATIENT
Start: 2025-02-18 | End: 2025-02-20 | Stop reason: HOSPADM

## 2025-02-18 RX ORDER — POLYETHYLENE GLYCOL 3350 17 G/17G
17 POWDER, FOR SOLUTION ORAL DAILY PRN
Status: DISCONTINUED | OUTPATIENT
Start: 2025-02-18 | End: 2025-02-20 | Stop reason: HOSPADM

## 2025-02-18 RX ORDER — AMOXICILLIN 250 MG
2 CAPSULE ORAL 2 TIMES DAILY
Status: DISCONTINUED | OUTPATIENT
Start: 2025-02-18 | End: 2025-02-20 | Stop reason: HOSPADM

## 2025-02-18 RX ADMIN — HEPARIN SODIUM 5000 UNITS: 5000 INJECTION INTRAVENOUS; SUBCUTANEOUS at 10:01

## 2025-02-18 RX ADMIN — SODIUM CHLORIDE, SODIUM LACTATE, POTASSIUM CHLORIDE, CALCIUM CHLORIDE 150 ML/HR: 20; 30; 600; 310 INJECTION, SOLUTION INTRAVENOUS at 18:18

## 2025-02-18 RX ADMIN — Medication 10 ML: at 22:08

## 2025-02-18 RX ADMIN — SODIUM PHOSPHATE, MONOBASIC, MONOHYDRATE AND SODIUM PHOSPHATE, DIBASIC, ANHYDROUS 15 MMOL: 276; 142 INJECTION, SOLUTION INTRAVENOUS at 11:38

## 2025-02-18 RX ADMIN — ACETAMINOPHEN 650 MG: 325 TABLET ORAL at 16:42

## 2025-02-18 RX ADMIN — SODIUM CHLORIDE, SODIUM LACTATE, POTASSIUM CHLORIDE, CALCIUM CHLORIDE 250 ML/HR: 20; 30; 600; 310 INJECTION, SOLUTION INTRAVENOUS at 07:31

## 2025-02-18 RX ADMIN — MUPIROCIN 1 APPLICATION: 20 OINTMENT TOPICAL at 10:01

## 2025-02-18 RX ADMIN — TAMSULOSIN HYDROCHLORIDE 0.4 MG: 0.4 CAPSULE ORAL at 10:01

## 2025-02-18 RX ADMIN — SODIUM CHLORIDE, SODIUM LACTATE, POTASSIUM CHLORIDE, CALCIUM CHLORIDE 150 ML/HR: 20; 30; 600; 310 INJECTION, SOLUTION INTRAVENOUS at 10:05

## 2025-02-18 RX ADMIN — MUPIROCIN 1 APPLICATION: 20 OINTMENT TOPICAL at 22:08

## 2025-02-18 RX ADMIN — ACETAMINOPHEN 650 MG: 325 TABLET ORAL at 03:40

## 2025-02-18 RX ADMIN — Medication 10 ML: at 10:01

## 2025-02-18 RX ADMIN — HEPARIN SODIUM 5000 UNITS: 5000 INJECTION INTRAVENOUS; SUBCUTANEOUS at 22:08

## 2025-02-18 RX ADMIN — SODIUM CHLORIDE, SODIUM LACTATE, POTASSIUM CHLORIDE, CALCIUM CHLORIDE 250 ML/HR: 20; 30; 600; 310 INJECTION, SOLUTION INTRAVENOUS at 03:40

## 2025-02-18 RX ADMIN — SODIUM PHOSPHATE, MONOBASIC, MONOHYDRATE AND SODIUM PHOSPHATE, DIBASIC, ANHYDROUS 15 MMOL: 276; 142 INJECTION, SOLUTION INTRAVENOUS at 07:42

## 2025-02-18 RX ADMIN — SODIUM CHLORIDE 2000 MG: 9 INJECTION, SOLUTION INTRAVENOUS at 18:19

## 2025-02-18 NOTE — PROGRESS NOTES
[facility]   Hospitalist Progress Note  Date: 2025  Patient Name: Kevin Reyes  : 1970  MRN: [id number]  Date of admission: 2025  Consultants:   -Pulmonology: Dr. Shyam Stallings, Dr. Earl Silva  -Gastroenterology: Dr. Artemio Ann    Subjective   Subjective     Chief Complaint: Abdominal pain    Summary:   Kevin Reyes is a 54 y.o. male with essential hypertension that presented with abdominal pain that acutely began that radiated to his back.  Labs showed leukocytosis, elevated lactic acid, elevated lipase, elevated AST/ALT.  Patient transferred to ICU level of care.  Gastroenterology and pulmonology consulted.  CT abdomen pelvis showed possible necrosis in the head and proximal body of the pancreas.  Empiric antibiotics started.  IV fluids initiated.  Patient underwent MRCP that showed worsening pancreatitis with no drainable fluid collection and some concern about mild ductal thickening with biliary sludge.    Interval Followup:   No acute events overnight.  Patient pain management with as needed IV morphine.  Patient stated he was feeling better.  Diet being advanced.    Antibiotics:   -Ceftriaxone    Pain Medication:   -IV morphine    Objective   Objective     Vitals:   Temp:  [99.6 °F (37.6 °C)-101.1 °F (38.4 °C)] 100.5 °F (38.1 °C)  Heart Rate:  [74-96] 78  Resp:  [16-31] 23  BP: (101-140)/(24-81) 133/75  Physical Exam   Gen: Pleasant, conversant, sitting up in bed  Resp: CTAB, No w/r/r, No respiratory distress appreciated  Card: RRR, No m/r/g  Abd: Soft, Nontender, Nondistended, + bowel sounds    Result Review    Result Review:  I have personally reviewed the results as below and agree with these findings:  []  Laboratory:   CMP          2025    00:19 2025    07:56 2025    02:47 2025    03:38   CMP   Glucose 199  148  125  93    BUN 19  17  11  9    Creatinine 1.00  1.01  0.66  0.67    EGFR 89.4  88.4  111.5  111.0    Sodium 138  137  134   133    Potassium 4.6  4.5  3.9  4.4    Chloride 102  100  102  101    Calcium 9.8  9.1  8.8  8.8    Total Protein 8.4    6.8    Albumin 4.2    3.0    Globulin 4.2    3.8    Total Bilirubin 1.0    1.3    Alkaline Phosphatase 159    108    AST (SGOT) 152    35    ALT (SGPT) 204    57    Albumin/Globulin Ratio 1.0    0.8    BUN/Creatinine Ratio 19.0  16.8  16.7  13.4    Anion Gap 11.7  13.5  7.2  11.2      CBC          2/16/2025    00:19 2/16/2025    07:56 2/17/2025    02:47 2/18/2025    03:38   CBC   WBC 17.34  21.18  25.76  20.60    RBC 6.22  5.89  5.10  4.88    Hemoglobin 18.3  17.5  15.1  14.4    Hematocrit 56.0  52.6  45.2  45.1    MCV 90.0  89.3  88.6  92.4    MCH 29.4  29.7  29.6  29.5    MCHC 32.7  33.3  33.4  31.9    RDW 13.6  13.4  13.6  13.5    Platelets 300  290  227  163    Phosphorus low.  Magnesium within normal limits.  [x]  Microbiology: Urine culture (02/16/2025): Serratia marcescens  []  Radiology:   [x]  EKG/Telemetry:    []  Cardiology/Vascular:    []  Pathology:  []  Old records:  []  Other:    Assessment & Plan   Assessment / Plan     Assessment:  Acute pancreatitis of unknown etiology  Urinary tract infection due to Serratia marcescens  Severe sepsis, present on admission  Lactic acidosis  Acute urinary retention  Dehydration  Severe abdominal pain secondary to pancreatitis  Hyponatremia  Hypocalcemia  Hypomagnesemia  Hypokalemia  Hypophosphatemia    Plan:  -Pulmonology and Gastroenterology consulted and following, appreciate assistance and recommendations in the care of this patient.  -Based on urine culture results transition patient to cefepime  -Clear liquid diet per GI  -Continue IV fluids  -MRCP reviewed.  Pending GI input about whether or not patient needs ERCP  -Replace phosphorus  -Will monitor electrolytes and renal function with BMP and magnesium level in the AM  -Will monitor WBC and Hgb with CBC in the AM  -Clinical course will dictate further management     DVT Prophylaxis:  Heparin  Diet:   Diet Order   Procedures    Diet: Liquid; Clear Liquid; Fluid Consistency: Thin (IDDSI 0)     Dispo: Home when medically appropriate for discharge     Time spent personally reviewing patient's chart, labs and imaging, evaluating/examining the patient, discussing care plan with patient and nurse at bedside and discussing management with consultants (Pulmonology and Gastroenterology): 51 minutes.     Part of this note may be an electronic transcription/translation of spoken language to printed text using the Dragon dictation system.    VTE Prophylaxis:  Pharmacologic VTE prophylaxis orders are present.      CODE STATUS:   Code Status (Patient has no pulse and is not breathing): CPR (Attempt to Resuscitate)  Medical Interventions (Patient has pulse or is breathing): Full Support      Electronically signed by Harsh Zarate MD, 2/18/2025, 17:21 EST.

## 2025-02-18 NOTE — PLAN OF CARE
Goal Outcome Evaluation:  Plan of Care Reviewed With: patient        Progress: improving  Outcome Evaluation: Pt in CCU. Tmax this shift 101.2 PRN tylenol given. Otherwise VSS. Pt tolerating clear liquids at this time. Continue plan of care.

## 2025-02-18 NOTE — PROGRESS NOTES
Pulmonary / Critical Care Progress Note      Patient Name: Kevin Reyes  : 1970  MRN: [id number]  Attending:  Harsh Zarate MD   Date of admission: 2025    Subjective   Subjective   Patient critically ill with acute pancreatitis    Over past 24 hours:  Continues on IV fluid resuscitation with LR  Indwelling Reese catheter in place  MRCP performed  Tmax 100.1      Objective   Objective     Vitals:   Vital signs for last 24 hours:  Temp:  [99.6 °F (37.6 °C)-101.1 °F (38.4 °C)] 100.8 °F (38.2 °C)  Heart Rate:  [] 95  Resp:  [8-31] 22  BP: (101-149)/(54-80) 101/71    Intake/Output last 3 shifts:  I/O last 3 completed shifts:  In: 8351 [I.V.:8351]  Out: 6600 [Urine:6600]    Physical Exam   Vital Signs Reviewed   General:  WDWN, Alert, NAD.    HEENT:  PERRL, EOMI.  OP, nares clear  Chest:  good aeration, clear to auscultation bilaterally, no work of breathing noted  CV: RRR, no MGR, pulses 2+, equal.  Abd:  Soft, mild distention, abdominal tenderness noted, + BS, no HSM  EXT:  no clubbing, no cyanosis, no edema  Neuro:  A&Ox3, CN grossly intact, no focal deficits.  Skin: No rashes or lesions noted      Result Review    Result Review:  I have personally reviewed the results from the time of this admission to 2025 09:05 EST and agree with these findings:  [x]  Laboratory  [x]  Microbiology  [x]  Radiology  [x]  EKG/Telemetry   []  Cardiology/Vascular   []  Pathology  []  Old records  []  Other:  Most notable findings include:       Lab 25  0338 25  0247 25  0756 25  0019   WBC 20.60* 25.76* 21.18* 17.34*   HEMOGLOBIN 14.4 15.1 17.5 18.3*   HEMATOCRIT 45.1 45.2 52.6* 56.0*   PLATELETS 163 227 290 300   SODIUM 133* 134* 137 138   POTASSIUM 4.4 3.9 4.5 4.6   CHLORIDE 101 102 100 102   CO2 20.8* 24.8 23.5 24.3   BUN 9 11 17 19   CREATININE 0.67* 0.66* 1.01 1.00   GLUCOSE 93 125* 148* 199*   CALCIUM 8.8 8.8 9.1 9.8   PHOSPHORUS 1.6*  --   --   --    TOTAL PROTEIN 6.8   --   --  8.4   ALBUMIN 3.0*  --   --  4.2   GLOBULIN 3.8  --   --  4.2     MRI Abdomen Without Contrast    Result Date: 2/17/2025  Impression: 1. Limited exam due to early termination of exam by patient. 2. Worsening now moderate to severe acute pancreatitis, complicated by region of parenchymal and peripancreatic hemorrhage and/or necrosis in the pancreatic neck. No drainable fluid collections. Limited assessment for mass or pancreatic divisum on this noncontrasted exam. Consider ERCP and/or short-term follow-up MRCP. 3. Mild dilation of the biliary tree with tapered appearance of the common bile duct through the region of the pancreatic head. Some degree of common bile duct narrowing related to edema is likely. Question layering debris in the common bile duct otherwise no obvious mass or choledocholithiasis on this limited exam. Consider ERCP. 4. Gallbladder wall thickening and edema without gallbladder distention or discrete stone, possibly secondary/reactive in the setting of pancreatitis. 5. Hepatic steatosis. 6. Increasing body wall edema. Electronically Signed: Manpreet Jiang MD  2/17/2025 3:17 PM EST  Workstation ID: [id number]    US Abdomen Complete    Result Date: 2/16/2025  Impression: 1.Gallbladder appears within normal limits. 2.Hepatic steatosis. 3.Pancreas not well seen, better characterized on recent prior CT. Electronically Signed: Ricci Weeks MD  2/16/2025 3:30 PM EST  Workstation ID: [id number]       Assessment & Plan   Assessment / Plan     Active Hospital Problems:  Active Hospital Problems    Diagnosis     **Pancreatitis     Lactic acidosis     Transaminitis     Leukocytosis      Impression:  Severe sepsis, present on admission  Acute pancreatitis of unknown etiology  Gram-negative francis urinary tract infection  Lactic acidosis, clinically significant  Acute urinary retention  Dehydration  Severe abdominal pain secondary to pancreatitis  Hyponatremia, clinically  insignificant  Hypocalcemia  Leukocytosis  Hypomagnesemia  Hypokalemia  Hypophosphatemia    Plan:  -Unknown etiology of pancreatitis.  -Appreciate GI assistance.  MRCP reviewed.  Does have worsening pancreatitis with no drainable fluid collection.  There is some concern about mild ductal thickening with biliary sludge.  Will await GI input about whether or not this patient requires an MRCP  -Clear liquid diet at this time per GI  -Continue LR.  Decrease to 150 mL/h  - IgG4 pending to workup autoimmune pancreatitis  -Trend liver enzymes  -Trend renal pane and l electrolytes.  Replace phosphorus IV  -Rocephin for UTI, de-escalate based off cultures  -Reese catheter for now for obstruction.  Continue tamsulosin  -Continue current narcotic analgesic regimen    Should be able to transfer out of ICU later today    VTE Prophylaxis: heparin  Pharmacologic VTE prophylaxis orders are present.    CODE STATUS:   Code Status (Patient has no pulse and is not breathing): CPR (Attempt to Resuscitate)  Medical Interventions (Patient has pulse or is breathing): Full Support      Patient is critically ill with lactic acidosis, pancreatitis, urinary tract infection, severe sepsis. We have personally reviewed all pertinent labs, imaging, microbiology and documentation. We have discussed care with the primary service as well as at multidisciplinary critical care rounds with the bedside nurse, respiratory therapist, pharmacist and all other ancillary services. 37 minutes of critical care time was spent managing this patient, excluding procedures. Of this time, I spent 23 minutes in accordance with split shared billing.    I, Payton Galeana PA-C, spent 14 minutes critical care time.  Electronically signed by OMARI Vega, 02/18/25, 12:41 PM EST.    Electronically signed by Shyam Stallings MD, 02/18/25, 2:01 PM EST.

## 2025-02-19 LAB
ALBUMIN SERPL-MCNC: 3.1 G/DL (ref 3.5–5.2)
ALBUMIN/GLOB SERPL: 0.9 G/DL
ALP SERPL-CCNC: 92 U/L (ref 39–117)
ALT SERPL W P-5'-P-CCNC: 40 U/L (ref 1–41)
ANION GAP SERPL CALCULATED.3IONS-SCNC: 8.4 MMOL/L (ref 5–15)
AST SERPL-CCNC: 22 U/L (ref 1–40)
BACTERIA SPEC AEROBE CULT: ABNORMAL
BILIRUB SERPL-MCNC: 1.1 MG/DL (ref 0–1.2)
BUN SERPL-MCNC: 10 MG/DL (ref 6–20)
BUN/CREAT SERPL: 12.2 (ref 7–25)
CALCIUM SPEC-SCNC: 8.3 MG/DL (ref 8.6–10.5)
CHLORIDE SERPL-SCNC: 99 MMOL/L (ref 98–107)
CO2 SERPL-SCNC: 23.6 MMOL/L (ref 22–29)
CREAT SERPL-MCNC: 0.82 MG/DL (ref 0.76–1.27)
DEPRECATED RDW RBC AUTO: 43.6 FL (ref 37–54)
EGFRCR SERPLBLD CKD-EPI 2021: 104.4 ML/MIN/1.73
ERYTHROCYTE [DISTWIDTH] IN BLOOD BY AUTOMATED COUNT: 13.3 % (ref 12.3–15.4)
GLOBULIN UR ELPH-MCNC: 3.4 GM/DL
GLUCOSE BLDC GLUCOMTR-MCNC: 123 MG/DL (ref 70–99)
GLUCOSE BLDC GLUCOMTR-MCNC: 129 MG/DL (ref 70–99)
GLUCOSE SERPL-MCNC: 96 MG/DL (ref 65–99)
HCT VFR BLD AUTO: 38.6 % (ref 37.5–51)
HGB BLD-MCNC: 12.8 G/DL (ref 13–17.7)
MAGNESIUM SERPL-MCNC: 2.1 MG/DL (ref 1.6–2.6)
MCH RBC QN AUTO: 29.6 PG (ref 26.6–33)
MCHC RBC AUTO-ENTMCNC: 33.2 G/DL (ref 31.5–35.7)
MCV RBC AUTO: 89.4 FL (ref 79–97)
PHOSPHATE SERPL-MCNC: 1.6 MG/DL (ref 2.5–4.5)
PLATELET # BLD AUTO: 195 10*3/MM3 (ref 140–450)
PMV BLD AUTO: 9.3 FL (ref 6–12)
POTASSIUM SERPL-SCNC: 3.4 MMOL/L (ref 3.5–5.2)
PROT SERPL-MCNC: 6.5 G/DL (ref 6–8.5)
RBC # BLD AUTO: 4.32 10*6/MM3 (ref 4.14–5.8)
SODIUM SERPL-SCNC: 131 MMOL/L (ref 136–145)
WBC NRBC COR # BLD AUTO: 13.94 10*3/MM3 (ref 3.4–10.8)

## 2025-02-19 PROCEDURE — 25810000003 LACTATED RINGERS PER 1000 ML: Performed by: INTERNAL MEDICINE

## 2025-02-19 PROCEDURE — 83735 ASSAY OF MAGNESIUM: CPT | Performed by: INTERNAL MEDICINE

## 2025-02-19 PROCEDURE — 97161 PT EVAL LOW COMPLEX 20 MIN: CPT

## 2025-02-19 PROCEDURE — 25010000002 CEFEPIME PER 500 MG: Performed by: INTERNAL MEDICINE

## 2025-02-19 PROCEDURE — 80053 COMPREHEN METABOLIC PANEL: CPT | Performed by: INTERNAL MEDICINE

## 2025-02-19 PROCEDURE — 84100 ASSAY OF PHOSPHORUS: CPT | Performed by: INTERNAL MEDICINE

## 2025-02-19 PROCEDURE — 25010000002 HEPARIN (PORCINE) PER 1000 UNITS: Performed by: STUDENT IN AN ORGANIZED HEALTH CARE EDUCATION/TRAINING PROGRAM

## 2025-02-19 PROCEDURE — 82948 REAGENT STRIP/BLOOD GLUCOSE: CPT | Performed by: NURSE PRACTITIONER

## 2025-02-19 PROCEDURE — 99232 SBSQ HOSP IP/OBS MODERATE 35: CPT | Performed by: INTERNAL MEDICINE

## 2025-02-19 PROCEDURE — 82948 REAGENT STRIP/BLOOD GLUCOSE: CPT

## 2025-02-19 PROCEDURE — 85027 COMPLETE CBC AUTOMATED: CPT | Performed by: INTERNAL MEDICINE

## 2025-02-19 PROCEDURE — 25810000003 SODIUM CHLORIDE 0.9 % SOLUTION: Performed by: INTERNAL MEDICINE

## 2025-02-19 PROCEDURE — 99233 SBSQ HOSP IP/OBS HIGH 50: CPT | Performed by: INTERNAL MEDICINE

## 2025-02-19 RX ORDER — HYDROCODONE BITARTRATE AND ACETAMINOPHEN 5; 325 MG/1; MG/1
1 TABLET ORAL EVERY 6 HOURS PRN
Status: DISCONTINUED | OUTPATIENT
Start: 2025-02-19 | End: 2025-02-20 | Stop reason: HOSPADM

## 2025-02-19 RX ORDER — POTASSIUM CHLORIDE 750 MG/1
40 CAPSULE, EXTENDED RELEASE ORAL ONCE
Status: COMPLETED | OUTPATIENT
Start: 2025-02-19 | End: 2025-02-19

## 2025-02-19 RX ORDER — HYDROCODONE BITARTRATE AND ACETAMINOPHEN 10; 325 MG/1; MG/1
1 TABLET ORAL EVERY 6 HOURS PRN
Status: DISCONTINUED | OUTPATIENT
Start: 2025-02-19 | End: 2025-02-20 | Stop reason: HOSPADM

## 2025-02-19 RX ORDER — FENTANYL/ROPIVACAINE/NS/PF 2-625MCG/1
15 PLASTIC BAG, INJECTION (ML) EPIDURAL ONCE
Status: COMPLETED | OUTPATIENT
Start: 2025-02-19 | End: 2025-02-19

## 2025-02-19 RX ADMIN — MUPIROCIN 1 APPLICATION: 20 OINTMENT TOPICAL at 08:56

## 2025-02-19 RX ADMIN — HEPARIN SODIUM 5000 UNITS: 5000 INJECTION INTRAVENOUS; SUBCUTANEOUS at 21:11

## 2025-02-19 RX ADMIN — SODIUM CHLORIDE 2000 MG: 9 INJECTION, SOLUTION INTRAVENOUS at 19:02

## 2025-02-19 RX ADMIN — SENNOSIDES AND DOCUSATE SODIUM 2 TABLET: 50; 8.6 TABLET ORAL at 08:55

## 2025-02-19 RX ADMIN — POTASSIUM PHOSPHATE, MONOBASIC AND POTASSIUM PHOSPHATE, DIBASIC 15 MMOL: 224; 236 INJECTION, SOLUTION, CONCENTRATE INTRAVENOUS at 08:57

## 2025-02-19 RX ADMIN — Medication 10 ML: at 21:12

## 2025-02-19 RX ADMIN — POTASSIUM CHLORIDE 40 MEQ: 750 CAPSULE, EXTENDED RELEASE ORAL at 08:55

## 2025-02-19 RX ADMIN — SODIUM CHLORIDE 2000 MG: 9 INJECTION, SOLUTION INTRAVENOUS at 01:33

## 2025-02-19 RX ADMIN — SODIUM CHLORIDE, SODIUM LACTATE, POTASSIUM CHLORIDE, CALCIUM CHLORIDE 150 ML/HR: 20; 30; 600; 310 INJECTION, SOLUTION INTRAVENOUS at 01:33

## 2025-02-19 RX ADMIN — SENNOSIDES AND DOCUSATE SODIUM 2 TABLET: 50; 8.6 TABLET ORAL at 21:12

## 2025-02-19 RX ADMIN — MUPIROCIN 1 APPLICATION: 20 OINTMENT TOPICAL at 21:18

## 2025-02-19 RX ADMIN — SODIUM CHLORIDE 2000 MG: 9 INJECTION, SOLUTION INTRAVENOUS at 13:37

## 2025-02-19 RX ADMIN — HEPARIN SODIUM 5000 UNITS: 5000 INJECTION INTRAVENOUS; SUBCUTANEOUS at 08:56

## 2025-02-19 RX ADMIN — TAMSULOSIN HYDROCHLORIDE 0.4 MG: 0.4 CAPSULE ORAL at 08:56

## 2025-02-19 RX ADMIN — Medication 10 ML: at 08:57

## 2025-02-19 NOTE — PLAN OF CARE
Goal Outcome Evaluation:   Na phos given per orders. Cefepime x1. VSS. Tylenol given x1 for fever. 3100 UOP. Reese catheter in place. IVF titrated to 150 per orders. Vee Santos RN

## 2025-02-19 NOTE — PLAN OF CARE
Goal Outcome Evaluation:  Plan of Care Reviewed With: patient        Progress: improving  Outcome Evaluation: Pt is pending to be medically stable to d/c home  Reese was removed and voided 200ml.  GI ok for d/c also.  Care plan ongoing

## 2025-02-19 NOTE — PROGRESS NOTES
[facility]  Inpatient Progress Note    Reason for Follow Up:  acute pancreatitis    Subjective     Interval History:   Feeling a lot better.  He denies any abdominal pain he is hungry wants to eat.  He has been switched to solid food but has not had any solid food yet.  He tolerated clear liquids without any problem.    Current Facility-Administered Medications:     acetaminophen (TYLENOL) tablet 650 mg, 650 mg, Oral, Q6H PRN, Elvira Majano APRN, 650 mg at 02/18/25 1642    sennosides-docusate (PERICOLACE) 8.6-50 MG per tablet 2 tablet, 2 tablet, Oral, BID, 2 tablet at 02/19/25 0855 **AND** polyethylene glycol (MIRALAX) packet 17 g, 17 g, Oral, Daily PRN **AND** bisacodyl (DULCOLAX) EC tablet 5 mg, 5 mg, Oral, Daily PRN **AND** bisacodyl (DULCOLAX) suppository 10 mg, 10 mg, Rectal, Daily PRN, Payton Galeana PA    cefepime 2000 mg IVPB in 100 mL NS (VTB), 2,000 mg, Intravenous, Q8H, Harsh Zarate MD, 2,000 mg at 02/19/25 0133    dextrose (D50W) (25 g/50 mL) IV injection 25 g, 25 g, Intravenous, Q15 Min PRN, Elvira Majano APRN    dextrose (GLUTOSE) oral gel 15 g, 15 g, Oral, Q15 Min PRN, Elvira Majano APRN    glucagon (GLUCAGEN) injection 1 mg, 1 mg, Intramuscular, Q15 Min PRN, Elvira Majano APRN    heparin (porcine) 5000 UNIT/ML injection 5,000 Units, 5,000 Units, Subcutaneous, Q12H, Dioni Chiu MD, 5,000 Units at 02/19/25 0856    insulin regular (humuLIN R,novoLIN R) injection 2-7 Units, 2-7 Units, Subcutaneous, Q6H, Elvira Majano APRN    lactated ringers infusion, 75 mL/hr, Intravenous, Continuous, Harsh Zarate MD, Last Rate: 75 mL/hr at 02/19/25 0900, 75 mL/hr at 02/19/25 0900    morphine injection 2 mg, 2 mg, Intravenous, Q4H PRN, 2 mg at 02/16/25 2046 **AND** naloxone (NARCAN) injection 0.4 mg, 0.4 mg, Intravenous, Q5 Min PRN, Dioni Chiu MD    morphine injection 4 mg, 4 mg, Intravenous, Q4H PRN, 4 mg at 02/16/25 1082 **AND** naloxone  (NARCAN) injection 0.4 mg, 0.4 mg, Intravenous, Q5 Min PRN, Dioni Chiu MD    mupirocin (BACTROBAN) 2 % nasal ointment 1 Application, 1 Application, Each Nare, BID, Uriel Salas MD, 1 Application at 02/19/25 0856    nitroglycerin (NITROSTAT) SL tablet 0.4 mg, 0.4 mg, Sublingual, Q5 Min PRN, Dioni Chiu MD    ondansetron ODT (ZOFRAN-ODT) disintegrating tablet 4 mg, 4 mg, Oral, Q6H PRN **OR** ondansetron (ZOFRAN) injection 4 mg, 4 mg, Intravenous, Q6H PRN, Dioni Chiu MD, 4 mg at 02/16/25 2046    potassium phosphate 15 mmol in 0.9% normal saline 250 mL IVPB, 15 mmol, Intravenous, Once, Harsh Zarate MD, 15 mmol at 02/19/25 0857    sodium chloride 0.9 % flush 10 mL, 10 mL, Intravenous, PRN, Manpreet Barnard MD    sodium chloride 0.9 % flush 10 mL, 10 mL, Intravenous, Q12H, Dioni Chiu MD, 10 mL at 02/19/25 0857    sodium chloride 0.9 % flush 10 mL, 10 mL, Intravenous, PRN, Doini Chiu MD    sodium chloride 0.9 % infusion 40 mL, 40 mL, Intravenous, PRN, Dioni Chiu MD    tamsulosin (FLOMAX) 24 hr capsule 0.4 mg, 0.4 mg, Oral, Daily, Elvira Majano Sylvia, APRN, 0.4 mg at 02/19/25 0856  Review of Systems:    All system ROS is negative except what's mentioned in HPI.    Objective     Vital Signs  Temp:  [98.6 °F (37 °C)-100.6 °F (38.1 °C)] 99.7 °F (37.6 °C)  Heart Rate:  [73-95] 82  Resp:  [16-24] 16  BP: (102-140)/(52-81) 113/81  Body mass index is 28.08 kg/m².    Intake/Output Summary (Last 24 hours) at 2/19/2025 1147  Last data filed at 2/19/2025 0900  Gross per 24 hour   Intake 3327 ml   Output 5450 ml   Net -2123 ml     I/O this shift:  In: 240 [P.O.:240]  Out: 950 [Urine:950]     Physical Exam:  General     Alert and oriented, normal affect   Head:    Normocephalic, without obvious abnormality, atraumatic   Eyes:          conjunctivae and sclerae normal, no icterus, pupils round and reactive to light   Oral cavity: Moist and intact, normal dentation   Neck:   Supple, no adenopathy   Chest  Wall/Lungs:    No abnormalities observed, equal on expansion bilaterally, no use of extrarespiratory muscles noted   Abdomen:     Soft, nondistended, nontender; normal bowel sounds, no hepatospleenomegaly   Extremities:   no edema, clubbing, or cyanosis   Skin:   No bruising or rash   Psychiatric:  normal mood and insight          Results Review:      Results from last 7 days   Lab Units 02/19/25 0458 02/18/25 0338 02/17/25 0247   WBC 10*3/mm3 13.94* 20.60* 25.76*   HEMOGLOBIN g/dL 12.8* 14.4 15.1   HEMATOCRIT % 38.6 45.1 45.2   PLATELETS 10*3/mm3 195 163 227     Results from last 7 days   Lab Units 02/19/25 0458 02/18/25 0338 02/17/25 0247 02/16/25  0756 02/16/25  0019   SODIUM mmol/L 131* 133* 134*   < > 138   POTASSIUM mmol/L 3.4* 4.4 3.9   < > 4.6   CHLORIDE mmol/L 99 101 102   < > 102   CO2 mmol/L 23.6 20.8* 24.8   < > 24.3   BUN mg/dL 10 9 11   < > 19   CREATININE mg/dL 0.82 0.67* 0.66*   < > 1.00   CALCIUM mg/dL 8.3* 8.8 8.8   < > 9.8   BILIRUBIN mg/dL 1.1 1.3*  --   --  1.0   ALK PHOS U/L 92 108  --   --  159*   ALT (SGPT) U/L 40 57*  --   --  204*   AST (SGOT) U/L 22 35  --   --  152*   GLUCOSE mg/dL 96 93 125*   < > 199*    < > = values in this interval not displayed.         Lab Results   Lab Value Date/Time    LIPASE 1,534 (H) 02/16/2025 0019         Assessment & Plan   Assessment:   Acute idiopathic pancreatitis with noninfected necrosis      Plan:   Continue to advance diet and follow-up patient in the office.    I discussed the patients findings and my recommendations with patient, hospitalist.    Angie Ann MD

## 2025-02-19 NOTE — THERAPY EVALUATION
Acute Care - Physical Therapy Initial Evaluation   April     Patient Name: Kevin Reyes  : 1970  MRN: [id number]  Today's Date: 2025      Visit Dx:     ICD-10-CM ICD-9-CM   1. Idiopathic acute pancreatitis, unspecified complication status  K85.00 577.0   2. Pain of upper abdomen  R10.10 789.09   3. Decreased activities of daily living (ADL)  Z78.9 V49.89   4. 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     PT Assessment (Last 12 Hours)       PT Evaluation and Treatment       Row Name 25 1000          Physical Therapy Time and Intention    Subjective Information no complaints  -DP     Document Type evaluation  -DP     Mode of Treatment individual therapy;physical therapy  -DP     Patient Effort good  -DP       Row Name 25 1000          General Information    Patient Profile Reviewed yes  -DP     Patient Observations alert;cooperative  -DP     General Observations of Patient Patient is wheelchair-bound at baseline.  He is independent with all wheelchair transfers.  -DP     Prior Level of Function independent:;transfer;bed mobility;ADL's  -DP     Equipment Currently Used at Home wheelchair  -DP     Existing Precautions/Restrictions fall  -DP     Barriers to Rehab none identified  -DP       Row Name 25 1000          Living Environment    Current Living Arrangements home  -DP     Home Accessibility wheelchair accessible  -DP     People in Home parent(s)  -DP     Primary Care Provided by self  -DP       Row Name 25 1000          Stairs Within Home, Primary    Number of Stairs, Within Home, Primary none  -DP       Row Name 25 1000          Cognition    Orientation Status  (Cognition) oriented x 3  -DP       Row Name 02/19/25 1000          Range of Motion (ROM)    Range of Motion bilateral lower extremities;ROM is WFL  Except right knee extension limited to -15 degrees  -DP       Row Name 02/19/25 1000          Strength Comprehensive (MMT)    General Manual Muscle Testing (MMT) Assessment lower extremity strength deficits identified  -DP     Comment, General Manual Muscle Testing (MMT) Assessment BLE: 4/5, in available range of motion  -DP       Row Name 02/19/25 1000          Bed Mobility    Bed Mobility supine-sit-supine  -DP     Supine-Sit-Supine Smyth (Bed Mobility) supervision  -DP       Row Name 02/19/25 1000          Transfers    Transfers bed-chair transfer  -DP       Row Name 02/19/25 1000          Bed-Chair Transfer    Bed-Chair Smyth (Transfers) contact guard  -DP       Row Name 02/19/25 1000          Gait/Stairs (Locomotion)    Comment, (Gait/Stairs) Patient is nonambulatory at baseline  -DP       Row Name 02/19/25 1000          Safety Issues/Impairments Affecting Functional Mobility    Impairments Affecting Function (Mobility) balance;endurance/activity tolerance  -DP       Row Name 02/19/25 1000          Balance    Balance Assessment sitting dynamic balance  -DP     Dynamic Sitting Balance supervision  -DP       Row Name 02/19/25 1000          Plan of Care Review    Plan of Care Reviewed With patient  -DP     Outcome Evaluation Pt presents with decreased strength, transfers and functional mobility. Will benefit from inpatient PT services and continued PT services upon discharge.  -DP       Row Name 02/19/25 1000          Therapy Assessment/Plan (PT)    Criteria for Skilled Interventions Met (PT) yes;meets criteria  -DP     Therapy Frequency (PT) daily  -DP     Predicted Duration of Therapy Intervention (PT) 10 days  -DP     Problem List (PT) problems related to;mobility  -DP     Activity Limitations Related to Problem List (PT) unable to transfer  safely;unable to ambulate safely  -DP       Row Name 02/19/25 1000          PT Evaluation Complexity    History, PT Evaluation Complexity no personal factors and/or comorbidities  -DP     Examination of Body Systems (PT Eval Complexity) total of 4 or more elements  -DP     Clinical Presentation (PT Evaluation Complexity) stable  -DP     Clinical Decision Making (PT Evaluation Complexity) low complexity  -DP     Overall Complexity (PT Evaluation Complexity) low complexity  -DP       Row Name 02/19/25 1000          Physical Therapy Goals    Transfer Goal Selection (PT) transfer, PT goal 1  -DP       Row Name 02/19/25 1000          Transfer Goal 1 (PT)    Activity/Assistive Device (Transfer Goal 1, PT) bed-to-chair/chair-to-bed  -DP     Urich Level/Cues Needed (Transfer Goal 1, PT) supervision required  -DP     Time Frame (Transfer Goal 1, PT) 10 days  -DP               User Key  (r) = Recorded By, (t) = Taken By, (c) = Cosigned By      Initials Name Provider Type    DP Ashley Darby, PT Physical Therapist                    Physical Therapy Education        No education to display                  PT Recommendation and Plan  Anticipated Discharge Disposition (PT): home with home health  Planned Therapy Interventions (PT): balance training, bed mobility training, gait training, strengthening, transfer training  Therapy Frequency (PT): daily  Plan of Care Reviewed With: patient  Outcome Evaluation: Pt presents with decreased strength, transfers and functional mobility. Will benefit from inpatient PT services and continued PT services upon discharge.   Outcome Measures       Row Name 02/19/25 1100             How much help from another person do you currently need...    Turning from your back to your side while in flat bed without using bedrails? 4  -DP      Moving from lying on back to sitting on the side of a flat bed without bedrails? 4  -DP      Moving to and from a bed to a chair (including a wheelchair)? 3   -DP      Standing up from a chair using your arms (e.g., wheelchair, bedside chair)? 3  -DP      Climbing 3-5 steps with a railing? 1  -DP      To walk in hospital room? 1  -DP      AM-PAC 6 Clicks Score (PT) 16  -DP         Functional Assessment    Outcome Measure Options AM-PAC 6 Clicks Basic Mobility (PT)  -DP                User Key  (r) = Recorded By, (t) = Taken By, (c) = Cosigned By      Initials Name Provider Type    Ashley Alcantara, PT Physical Therapist                     Time Calculation:    PT Charges       Row Name 02/19/25 1103             Time Calculation    PT Received On 02/19/25  -DP      PT Goal Re-Cert Due Date 02/28/25  -DP         Untimed Charges    PT Eval/Re-eval Minutes 40  -DP         Total Minutes    Untimed Charges Total Minutes 40  -DP       Total Minutes 40  -DP                User Key  (r) = Recorded By, (t) = Taken By, (c) = Cosigned By      Initials Name Provider Type    Ashley Alcantara, PT Physical Therapist                      PT G-Codes  Outcome Measure Options: AM-PAC 6 Clicks Basic Mobility (PT)  AM-PAC 6 Clicks Score (PT): 16  AM-PAC 6 Clicks Score (OT): 18    Ashley Darby, PT  2/19/2025

## 2025-02-19 NOTE — PLAN OF CARE
Goal Outcome Evaluation:         Alert and able to make needs known, rested through the night, will continue with plan of care

## 2025-02-19 NOTE — PLAN OF CARE
Goal Outcome Evaluation:  Plan of Care Reviewed With: patient           Outcome Evaluation: Pt presents with decreased strength, transfers and functional mobility. Will benefit from inpatient PT services and continued PT services upon discharge.    Anticipated Discharge Disposition (PT): home with home health

## 2025-02-19 NOTE — PROGRESS NOTES
[facility]   Hospitalist Progress Note  Date: 2025  Patient Name: Kevin Reyes  : 1970  MRN: [id number]  Date of admission: 2025  Consultants:   -Pulmonology: Dr. Shyam Stallings, Dr. Earl Silva  -Gastroenterology: Dr. Artemio Ann    Subjective   Subjective     Chief Complaint: Abdominal pain    Summary:   Kevin Reyes is a 54 y.o. male with essential hypertension that presented with abdominal pain that acutely began that radiated to his back.  Labs showed leukocytosis, elevated lactic acid, elevated lipase, elevated AST/ALT.  Patient transferred to ICU level of care.  Gastroenterology and pulmonology consulted.  CT abdomen pelvis showed possible necrosis in the head and proximal body of the pancreas.  Empiric antibiotics started.  IV fluids initiated.  Patient underwent MRCP that showed worsening pancreatitis with no drainable fluid collection and some concern about mild ductal thickening with biliary sludge.    Interval Followup:   No acute issues overnight.  Patient tolerating liquid diet.  Says the pain is much improved.  Denied chest pain or shortness of breath.  Nursing with no additional acute issues to report.    Antibiotics:   -Ceftriaxone    Pain Medication:   -Norco    Objective   Objective     Vitals:   Temp:  [98.6 °F (37 °C)-100.6 °F (38.1 °C)] 99.7 °F (37.6 °C)  Heart Rate:  [73-95] 82  Resp:  [16-24] 16  BP: (102-138)/(52-81) 113/81  Physical Exam   Gen: Sitting up in bed, conversant, pleasant  Resp: CTAB, No w/r/r, equal chest rise bilaterally  Card: RRR, No m/r/g  Abd: Soft, Nontender, Nondistended, + bowel sounds    Result Review    Result Review:  I have personally reviewed the results as below and agree with these findings:  []  Laboratory:   CMP          2025    02:47 2025    03:38 2025    04:58   CMP   Glucose 125  93  96    BUN 11  9  10    Creatinine 0.66  0.67  0.82    EGFR 111.5  111.0  104.4    Sodium 134  133  131    Potassium  3.9  4.4  3.4    Chloride 102  101  99    Calcium 8.8  8.8  8.3    Total Protein  6.8  6.5    Albumin  3.0  3.1    Globulin  3.8  3.4    Total Bilirubin  1.3  1.1    Alkaline Phosphatase  108  92    AST (SGOT)  35  22    ALT (SGPT)  57  40    Albumin/Globulin Ratio  0.8  0.9    BUN/Creatinine Ratio 16.7  13.4  12.2    Anion Gap 7.2  11.2  8.4      CBC          2/17/2025    02:47 2/18/2025    03:38 2/19/2025    04:58   CBC   WBC 25.76  20.60  13.94    RBC 5.10  4.88  4.32    Hemoglobin 15.1  14.4  12.8    Hematocrit 45.2  45.1  38.6    MCV 88.6  92.4  89.4    MCH 29.6  29.5  29.6    MCHC 33.4  31.9  33.2    RDW 13.6  13.5  13.3    Platelets 227  163  195    Phosphorus low.  Magnesium within normal limits  [x]  Microbiology: Urine culture (02/16/2025): Serratia marcescens  []  Radiology:   [x]  EKG/Telemetry:    []  Cardiology/Vascular:    []  Pathology:  []  Old records:  []  Other:    Assessment & Plan   Assessment / Plan     Assessment:  Acute pancreatitis of unknown etiology  Urinary tract infection due to Serratia marcescens  Severe sepsis, present on admission  Lactic acidosis  Acute urinary retention  Dehydration  Severe abdominal pain secondary to pancreatitis  Hyponatremia  Hypocalcemia  Hypomagnesemia  Hypokalemia  Hypophosphatemia    Plan:  -Pulmonology and Gastroenterology consulted and following, appreciate assistance and recommendations in the care of this patient.  -Continue cefepime  -Advance diet  -Decrease IV fluid rate  -Management discussed with gastroenterology.  Advance diet.  Patient will need outpatient CT with pancreatic protocol in 3-4 weeks.  -Replace potassium and phosphorus  -Voiding trial today  -Monitor electrolytes and renal function with BMP and magnesium level in the AM  -Monitor WBC and Hgb with CBC in the AM  -Clinical course will dictate further management     DVT Prophylaxis: Heparin  Diet:   Diet Order   Procedures    Diet: Gastrointestinal; Fat-Restricted; Fluid Consistency:  Thin (IDDSI 0)     Dispo: Home when medically appropriate for discharge     Time spent personally reviewing patient's chart, labs and imaging, evaluating/examining the patient, discussing care plan with patient and nurse at bedside and discussing management with consultants (Pulmonology and Gastroenterology): 51 minutes.     Part of this note may be an electronic transcription/translation of spoken language to printed text using the Dragon dictation system.    VTE Prophylaxis:  Pharmacologic VTE prophylaxis orders are present.      CODE STATUS:   Code Status (Patient has no pulse and is not breathing): CPR (Attempt to Resuscitate)  Medical Interventions (Patient has pulse or is breathing): Full Support      Electronically signed by Harsh Zarate MD, 2/19/2025, 13:52 EST.

## 2025-02-20 ENCOUNTER — READMISSION MANAGEMENT (OUTPATIENT)
Dept: CALL CENTER | Facility: HOSPITAL | Age: 55
End: 2025-02-20
Payer: MEDICARE

## 2025-02-20 VITALS
SYSTOLIC BLOOD PRESSURE: 126 MMHG | HEIGHT: 69 IN | WEIGHT: 187.39 LBS | RESPIRATION RATE: 16 BRPM | TEMPERATURE: 99.1 F | DIASTOLIC BLOOD PRESSURE: 61 MMHG | OXYGEN SATURATION: 96 % | BODY MASS INDEX: 27.76 KG/M2 | HEART RATE: 97 BPM

## 2025-02-20 LAB
ANION GAP SERPL CALCULATED.3IONS-SCNC: 9.1 MMOL/L (ref 5–15)
BUN SERPL-MCNC: 10 MG/DL (ref 6–20)
BUN/CREAT SERPL: 13 (ref 7–25)
CALCIUM SPEC-SCNC: 8.9 MG/DL (ref 8.6–10.5)
CHLORIDE SERPL-SCNC: 102 MMOL/L (ref 98–107)
CO2 SERPL-SCNC: 24.9 MMOL/L (ref 22–29)
CREAT SERPL-MCNC: 0.77 MG/DL (ref 0.76–1.27)
DEPRECATED RDW RBC AUTO: 43.5 FL (ref 37–54)
EGFRCR SERPLBLD CKD-EPI 2021: 106.4 ML/MIN/1.73
ERYTHROCYTE [DISTWIDTH] IN BLOOD BY AUTOMATED COUNT: 13.2 % (ref 12.3–15.4)
GLUCOSE BLDC GLUCOMTR-MCNC: 112 MG/DL (ref 70–99)
GLUCOSE BLDC GLUCOMTR-MCNC: 124 MG/DL (ref 70–99)
GLUCOSE SERPL-MCNC: 117 MG/DL (ref 65–99)
HCT VFR BLD AUTO: 41 % (ref 37.5–51)
HGB BLD-MCNC: 14 G/DL (ref 13–17.7)
MAGNESIUM SERPL-MCNC: 2.4 MG/DL (ref 1.6–2.6)
MCH RBC QN AUTO: 30.8 PG (ref 26.6–33)
MCHC RBC AUTO-ENTMCNC: 34.1 G/DL (ref 31.5–35.7)
MCV RBC AUTO: 90.1 FL (ref 79–97)
PHOSPHATE SERPL-MCNC: 2.3 MG/DL (ref 2.5–4.5)
PLATELET # BLD AUTO: 258 10*3/MM3 (ref 140–450)
PMV BLD AUTO: 9.6 FL (ref 6–12)
POTASSIUM SERPL-SCNC: 3.6 MMOL/L (ref 3.5–5.2)
RBC # BLD AUTO: 4.55 10*6/MM3 (ref 4.14–5.8)
SODIUM SERPL-SCNC: 136 MMOL/L (ref 136–145)
WBC NRBC COR # BLD AUTO: 14.57 10*3/MM3 (ref 3.4–10.8)

## 2025-02-20 PROCEDURE — 99239 HOSP IP/OBS DSCHRG MGMT >30: CPT | Performed by: INTERNAL MEDICINE

## 2025-02-20 PROCEDURE — 84100 ASSAY OF PHOSPHORUS: CPT | Performed by: INTERNAL MEDICINE

## 2025-02-20 PROCEDURE — 85027 COMPLETE CBC AUTOMATED: CPT | Performed by: INTERNAL MEDICINE

## 2025-02-20 PROCEDURE — 80048 BASIC METABOLIC PNL TOTAL CA: CPT | Performed by: INTERNAL MEDICINE

## 2025-02-20 PROCEDURE — 83735 ASSAY OF MAGNESIUM: CPT | Performed by: INTERNAL MEDICINE

## 2025-02-20 PROCEDURE — 25010000002 CEFEPIME PER 500 MG: Performed by: INTERNAL MEDICINE

## 2025-02-20 PROCEDURE — 25010000002 HEPARIN (PORCINE) PER 1000 UNITS: Performed by: STUDENT IN AN ORGANIZED HEALTH CARE EDUCATION/TRAINING PROGRAM

## 2025-02-20 PROCEDURE — 82948 REAGENT STRIP/BLOOD GLUCOSE: CPT

## 2025-02-20 PROCEDURE — 82948 REAGENT STRIP/BLOOD GLUCOSE: CPT | Performed by: NURSE PRACTITIONER

## 2025-02-20 RX ORDER — TAMSULOSIN HYDROCHLORIDE 0.4 MG/1
0.4 CAPSULE ORAL DAILY
Qty: 30 CAPSULE | Refills: 0 | Status: SHIPPED | OUTPATIENT
Start: 2025-02-21 | End: 2025-03-23

## 2025-02-20 RX ORDER — LEVOFLOXACIN 750 MG/1
750 TABLET, FILM COATED ORAL DAILY
Qty: 6 TABLET | Refills: 0 | Status: SHIPPED | OUTPATIENT
Start: 2025-02-20 | End: 2025-02-26

## 2025-02-20 RX ADMIN — SENNOSIDES AND DOCUSATE SODIUM 2 TABLET: 50; 8.6 TABLET ORAL at 10:42

## 2025-02-20 RX ADMIN — TAMSULOSIN HYDROCHLORIDE 0.4 MG: 0.4 CAPSULE ORAL at 10:42

## 2025-02-20 RX ADMIN — MUPIROCIN 1 APPLICATION: 20 OINTMENT TOPICAL at 10:42

## 2025-02-20 RX ADMIN — SODIUM CHLORIDE 2000 MG: 9 INJECTION, SOLUTION INTRAVENOUS at 03:20

## 2025-02-20 RX ADMIN — HEPARIN SODIUM 5000 UNITS: 5000 INJECTION INTRAVENOUS; SUBCUTANEOUS at 10:42

## 2025-02-20 RX ADMIN — SODIUM CHLORIDE 2000 MG: 9 INJECTION, SOLUTION INTRAVENOUS at 10:42

## 2025-02-20 RX ADMIN — Medication 10 ML: at 10:46

## 2025-02-20 NOTE — OUTREACH NOTE
Prep Survey      Flowsheet Row Responses   [facility] patient discharged from? [facility]   Is LACE score < 7 ? No   Eligibility [facility]   Date of Admission 02/16/25   Date of Discharge 02/20/25   Discharge diagnosis Acute pancreatitis of unknown etiology  Urinary tract infection due to Serratia marcescens  Severe sepsis   Does the patient have one of the following disease processes/diagnoses(primary or secondary)? Sepsis   Prep survey completed? Yes            Karen JACOBO - Registered Nurse

## 2025-02-20 NOTE — DISCHARGE SUMMARY
[facility]    Patient Name: Kevin Reyes  : 1970  MRN: [id number]    Date of Admission: 2025  Date of Discharge:  25  Primary Care Physician: Maria Luisa Schwartz APRN    Consultants:  -Pulmonology: Dr. Shyam Stallings, Dr. Earl Silva  -Gastroenterology: Dr. Ulloa Seth    [facility] Problems:  Acute pancreatitis of unknown etiology  Urinary tract infection due to Serratia marcescens  Severe sepsis, present on admission  Lactic acidosis  Acute urinary retention  Dehydration  Severe abdominal pain secondary to pancreatitis  Hyponatremia  Hypocalcemia  Hypomagnesemia  Hypokalemia  Hypophosphatemia    Hospital Course     Hospital Course:  Kevin Reyes is a 54 y.o. male with essential hypertension that presented with abdominal pain that acutely began that radiated to his back. Labs showed leukocytosis, elevated lactic acid, elevated lipase, elevated AST/ALT. Patient transferred to ICU level of care. Gastroenterology and pulmonology consulted. CT abdomen pelvis showed possible necrosis in the head and proximal body of the pancreas. Empiric antibiotics started. IV fluids initiated. Patient underwent MRCP that showed worsening pancreatitis with no drainable fluid collection and some concern about mild ductal thickening with biliary sludge.  Urine culture returned positive for Serratia marcescens and patient will discharge on antibiotic treatment based on culture sensitivity results to complete treatment.  Patient's abdominal discomfort improved and patient is able to tolerate diet.  Management was discussed with gastroenterology and patient to follow-up outpatient with GI after discharge.  Gastroenterology with plans to obtain outpatient CT with pancreatic protocol in 3-4 weeks in addition to other lab tests.  On day of discharge patient hemodynamically stable and no additional inpatient evaluation or workup necessary at this  time, patient will discharge home with outpatient follow-up.    DISCHARGE Follow Up Recommendations for labs and diagnostics:   -Follow-up with PCP in 3 to 5 days  -Follow-up with gastroenterology in 2-3 weeks    Day of Discharge     Vital Signs:  Temp:  [98.4 °F (36.9 °C)-100.4 °F (38 °C)] 99 °F (37.2 °C)  Heart Rate:  [75-88] 88  Resp:  [16-18] 16  BP: (106-134)/(51-98) 134/98  Physical Exam:   Gen: Pleasant, conversant, sitting up in bed  Resp: Good aeration, normal respiratory effort  Card: RRR, No m/r/g  Abd: Soft, Nontender, Nondistended, + bowel sounds     Discharge Details        Discharge Medications        New Medications        Instructions Start Date   levoFLOXacin 750 MG tablet  Commonly known as: LEVAQUIN   750 mg, Oral, Daily      tamsulosin 0.4 MG capsule 24 hr capsule  Commonly known as: FLOMAX   0.4 mg, Oral, Daily   Start Date: February 21, 2025            Continue These Medications        Instructions Start Date   aspirin 81 MG EC tablet   81 mg, Daily               No Known Allergies    Discharge Disposition:  Home or Self Care    Diet:  Hospital:  Diet Order   Procedures   • Diet: Gastrointestinal; Fat-Restricted; Fluid Consistency: Thin (IDDSI 0)       Discharge Activity:   Activity Instructions       Activity as Tolerated              CODE STATUS:  Code Status and Medical Interventions: CPR (Attempt to Resuscitate); Full Support   Ordered at: 02/16/25 0236     Code Status (Patient has no pulse and is not breathing):    CPR (Attempt to Resuscitate)     Medical Interventions (Patient has pulse or is breathing):    Full Support       No future appointments.    Additional Instructions for the Follow-ups that You Need to Schedule       Discharge Follow-up with PCP   As directed       Currently Documented PCP:    Maria Luisa Schwartz APRN    PCP Phone Number:    [phone]     Follow Up Details: Follow-up in 3 to 5 days                Pertinent  and/or Most Recent Results     RADIOLOGY:  MRI  Abdomen Without Contrast [[id number]] Matthew as Reviewed   Order Status: Completed Collected: 02/17/25 1506    Updated: 02/17/25 1519   Narrative:     MRI ABDOMEN WO CONTRAST    Date of Exam: 2/17/2025 10:35 AM EST    Indication: pancreatitis.     Comparison: CT abdomen pelvis 2/16/2025    Technique:  Routine multiplanar/multisequence images of the abdomen were obtained without contrast administration.      Findings:  Limited exam, exam terminated early by patient.    Size and contour liver and spleen appear within normal limits. Signal loss on out of phase imaging throughout the liver consistent with hepatic steatosis. Nonspecific mild gallbladder wall thickening and edema in the setting of under distention, possibly   secondary thickening related to patient's known pancreatitis. Mild dilation of biliary tree with tapering of the distal common bile duct towards the ampulla. No obvious mass or filling defect. Question layering T2 hypointense signal in the common bile  duct image 16 series 6. This could reflect to mass effect from known pancreatitis or potential occult lesion.    Moderate to severe interstitial and peripancreatic edema worsened from recent CT comparison. There is a 2.5 x 3.6 cm region of T2 hypointense T1 hyperintense signal in the region of the pancreatic neck image 15 series 6. Degree of multicompartmental  edema increasing from prior without organized/drainable fluid collection. There is no dilation of the main pancreatic duct. Limited assessment for mass or pancreatic divisum.    No suspicious adrenal nodule. Symmetric renal size and contour. Mild dilation of renal pelves similar to prior without overt hydronephrosis. No convincing bowel obstruction. Normal caliber abdominal aorta. Extensive aortic atherosclerotic disease better  assessed on prior comparison. No clearly suspicious adenopathy. Reticular subcutaneous body wall edema centered at midline without drainable collections. Prior laparotomy.  No convincing infiltrative marrow process.   Impression:     Impression:  1. Limited exam due to early termination of exam by patient.  2. Worsening now moderate to severe acute pancreatitis, complicated by region of parenchymal and peripancreatic hemorrhage and/or necrosis in the pancreatic neck. No drainable fluid collections. Limited assessment for mass or pancreatic divisum on this  noncontrasted exam. Consider ERCP and/or short-term follow-up MRCP.  3. Mild dilation of the biliary tree with tapered appearance of the common bile duct through the region of the pancreatic head. Some degree of common bile duct narrowing related to edema is likely. Question layering debris in the common bile duct  otherwise no obvious mass or choledocholithiasis on this limited exam. Consider ERCP.  4. Gallbladder wall thickening and edema without gallbladder distention or discrete stone, possibly secondary/reactive in the setting of pancreatitis.  5. Hepatic steatosis.  6. Increasing body wall edema.        Electronically Signed: Manpreet Jiang MD   2/17/2025 3:17 PM EST   Workstation ID: [id number]    US Abdomen Complete [[id number]] Matthew as Reviewed   Order Status: Completed Collected: 02/16/25 1526    Updated: 02/16/25 1532   Narrative:     US ABDOMEN COMPLETE    Date of Exam: 2/16/2025 1:53 PM EST    Indication: pancreatitis.    Comparison: CT abdomen pelvis from earlier today    Technique: Routine gray scale and color Doppler imaging of the complete abdomen was performed according to routine protocol.      Findings:  The pancreas is not well seen secondary to overlying bowel gas, better characterized on recent CT. The liver measures 17.3 cm and has diffuse increased echogenicity compatible with steatosis. No focal hepatic mass is identified. The hepatic vasculature  appears within normal limits. The gallbladder appears normal. There was a negative sonographic Mendez sign. The common bile duct is normal in caliber at 6 mm. Both  kidneys appear normal, with the right kidney measuring 11.4 cm and the left kidney  measuring 11.8 cm. The spleen measures 10.8 cm and appears normal. The abdominal aorta and inferior vena cava are poorly visualized secondary to overlying bowel gas.   Impression:     Impression:  1.Gallbladder appears within normal limits.  2.Hepatic steatosis.  3.Pancreas not well seen, better characterized on recent prior CT.        Electronically Signed: Ricci Weeks MD   2/16/2025 3:30 PM EST   Workstation ID: [id number]    CT Abdomen Pelvis With Contrast [[id number]] Matthew as Reviewed   Order Status: Completed Collected: 02/16/25 0127    Updated: 02/16/25 0143   Narrative:     CT ABDOMEN PELVIS W CONTRAST    Date of Exam: 2/16/2025 1:13 AM EST    Indication: Abdominal pain and nausea.    Comparison: None available.    Technique: Axial CT images were obtained of the abdomen and pelvis after the uneventful intravenous administration of iodinated contrast. Reconstructed coronal and sagittal images were also obtained. Automated exposure control and iterative construction  methods were used.    Findings:  Minimal atelectasis noted in the right lower lobe. There is atherosclerotic disease which is fairly extensive. There is marked luminal narrowing in the lower aorta. 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 There is a high-grade stenosis in the proximal left common iliac artery. There is also probably a high-grade stenosis in the proximal internal iliac arteries on both sides. The left external iliac artery   is somewhat small in caliber but fairly widely patent. The gallbladder is contracted. No biliary obstruction is seen. There are numerous surgical clips in the retroperitoneum. Correlate with surgical history. There is diffuse atrophy of the paraspinous  musculature, as well as the right psoas muscle and right side hip and proximal  thigh musculature.    There is some cortical scarring in both kidneys. There is a right renal cortical cyst. No follow-up is indicated. Both kidneys are otherwise normal and nonobstructed. There is hepatic steatosis. No definite liver mass. The adrenal glands and spleen are  normal. There is some stranding around the pancreas compatible with acute pancreatitis. Additionally, there is an area of poor enhancement or nonenhancement involving the proximal pancreatic body and a portion of the pancreatic head. It is unclear if  this is pancreatic necrosis or pancreatic mass. The area measures roughly 4.6 x 3.3 cm. No pancreatic ductal dilatation. This could be better assessed with nonemergent MRI with contrast.    Urinary bladder is massively distended, and there is some thickening of the urinary bladder wall. Findings could reflect a chronic bladder outlet obstruction with chronic cystitis. Prostate is grossly normal for size. The appendix is normal. No evidence  of acute colitis. No small bowel obstruction is seen. There is wall thickening and wall enhancement involving the proximal duodenum, and there is surrounding fluid. This is probably reactive duodenitis from the pancreatitis. Fluid extends out laterally  into the anterior right abdomen and into the right paracolic gutter.   Impression:     1.Acute pancreatitis.  2.There is an area of poor enhancement or nonenhancement involving the proximal pancreatic body and a portion of the pancreatic head. It is unclear if this is pancreatic necrosis or pancreatic mass. This could be better assessed with nonemergent MRI with   contrast.  3.There is wall thickening and wall enhancement involving the proximal duodenum, and there is surrounding fluid. This is probably reactive duodenitis from the pancreatitis.  4.Hepatic steatosis.  5.Massively distended urinary bladder with thickening of the bladder wall. Findings could reflect a chronic bladder outlet obstruction with chronic  cystitis.  6.Extensive atherosclerotic disease as described above.  7.Additional chronic findings as described above.        Electronically Signed: Bijan Patel MD   2/16/2025 1:41 AM EST   Workstation ID: [id number]     LAB RESULTS:      Lab 02/20/25  0503 02/19/25  0458 02/18/25  0338 02/17/25  0247 02/16/25  2253 02/16/25  1659 02/16/25  1054 02/16/25  0756 02/16/25  0426 02/16/25  0019   WBC 14.57* 13.94* 20.60* 25.76*  --   --   --  21.18*  --  17.34*   HEMOGLOBIN 14.0 12.8* 14.4 15.1  --   --   --  17.5  --  18.3*   HEMATOCRIT 41.0 38.6 45.1 45.2  --   --   --  52.6*  --  56.0*   PLATELETS 258 195 163 227  --   --   --  290  --  300   NEUTROS ABS  --   --  17.08* 22.29*  --   --   --  18.59*  --  15.24*   IMMATURE GRANS (ABS)  --   --  0.14* 0.11*  --   --   --  0.09*  --  0.07*   LYMPHS ABS  --   --  1.63 1.49  --   --   --  1.26  --  1.16   MONOS ABS  --   --  1.53* 1.79*  --   --   --  1.19*  --  0.76   EOS ABS  --   --  0.15 0.03  --   --   --  0.01  --  0.02   MCV 90.1 89.4 92.4 88.6  --   --   --  89.3  --  90.0   PROCALCITONIN  --   --   --   --   --   --   --  0.32*  --   --    LACTATE  --   --   --   --  1.9 2.2* 4.1* 3.5* 3.0* 3.7*   LDH  --   --   --   --   --   --   --  255*  --   --          Lab 02/20/25  0503 02/19/25  0458 02/18/25  0338 02/17/25  0247 02/16/25  0756   SODIUM 136 131* 133* 134* 137   POTASSIUM 3.6 3.4* 4.4 3.9 4.5   CHLORIDE 102 99 101 102 100   CO2 24.9 23.6 20.8* 24.8 23.5   ANION GAP 9.1 8.4 11.2 7.2 13.5   BUN 10 10 9 11 17   CREATININE 0.77 0.82 0.67* 0.66* 1.01   EGFR 106.4 104.4 111.0 111.5 88.4   GLUCOSE 117* 96 93 125* 148*   CALCIUM 8.9 8.3* 8.8 8.8 9.1   MAGNESIUM 2.4 2.1 2.3 1.6  --    PHOSPHORUS 2.3* 1.6* 1.6*  --   --          Lab 02/19/25  0458 02/18/25  0338 02/16/25  0019   TOTAL PROTEIN 6.5 6.8 8.4   ALBUMIN 3.1* 3.0* 4.2   GLOBULIN 3.4 3.8 4.2   ALT (SGPT) 40 57* 204*   AST (SGOT) 22 35 152*   BILIRUBIN 1.1 1.3* 1.0   ALK PHOS 92 108 159*   LIPASE  --   --   1,534*             Lab 02/16/25  0019   TRIGLYCERIDES 241*             Brief Urine Lab Results  (Last result in the past 365 days)        Color   Clarity   Blood   Leuk Est   Nitrite   Protein   CREAT   Urine HCG        02/16/25 2055 Yellow   Slightly Cloudy   Small (1+)   Small (1+)   Negative   100 mg/dL (2+)                 Microbiology Results (last 10 days)       Procedure Component Value - Date/Time    Urine Culture - Urine, Indwelling Urethral Catheter [[id number]]  (Abnormal)  (Susceptibility) Collected: 02/16/25 1717    Lab Status: Final result Specimen: Urine from Indwelling Urethral Catheter Updated: 02/19/25 1316     Urine Culture 50,000 CFU/mL Serratia marcescens    Narrative:      Colonization of the urinary tract without infection is common. Treatment is discouraged unless the patient is symptomatic, pregnant, or undergoing an invasive urologic procedure.    Susceptibility        Serratia marcescens      GENIE      Amoxicillin + Clavulanate Resistant      Cefazolin (Urine) Resistant      Cefepime Susceptible dose dependent      Ceftazidime Resistant      Ceftriaxone Resistant      Gentamicin Susceptible      Levofloxacin Susceptible      Nitrofurantoin Resistant      Piperacillin + Tazobactam Resistant  [1]       Trimethoprim + Sulfamethoxazole Susceptible                   [1]  Appended report. These results have been appended to a previously preliminary verified report.                Susceptibility Comments       Serratia marcescens    Pip/tazo confirmed by e-test               Blood Culture - Blood, Arm, Right [[id number]]  (Normal) Collected: 02/16/25 1659    Lab Status: Preliminary result Specimen: Blood from Arm, Right Updated: 02/19/25 1715     Blood Culture No growth at 3 days    Narrative:      Less than seven (7) mL's of blood was collected.  Insufficient quantity may yield false negative results.    Blood Culture - Blood, Arm, Left [[id number]]  (Normal) Collected: 02/16/25 1659    Lab Status:  Preliminary result Specimen: Blood from Arm, Left Updated: 02/19/25 1715     Blood Culture No growth at 3 days    Narrative:      Less than seven (7) mL's of blood was collected.  Insufficient quantity may yield false negative results.              Labs Pending at Discharge:  Pending Labs       Order Current Status    Blood Culture - Blood, Arm, Left Preliminary result    Blood Culture - Blood, Arm, Right Preliminary result            Time spent on Discharge including face to face service:  31 minutes    Electronically signed by Harsh Zarate MD, 02/20/25, 11:03 AM EST.

## 2025-02-20 NOTE — DISCHARGE INSTR - LAB
Follow up with KELSI Rubalcava on Wednesday, February 26th at 2:15 pm.    Dr. Ann's office (Gastroenterology) will call you to schedule an appointment.

## 2025-02-20 NOTE — PLAN OF CARE
Goal Outcome Evaluation:  Plan of Care Reviewed With: patient        Progress: improving  Outcome Evaluation: Alert and oriented X4. Vitals stable and denies pain. Voided well independently using urinal. No overnight concerns or events to report. Will continue care per POC

## 2025-02-21 ENCOUNTER — TRANSITIONAL CARE MANAGEMENT TELEPHONE ENCOUNTER (OUTPATIENT)
Dept: CALL CENTER | Facility: HOSPITAL | Age: 55
End: 2025-02-21
Payer: MEDICARE

## 2025-02-21 LAB
BACTERIA SPEC AEROBE CULT: NORMAL
BACTERIA SPEC AEROBE CULT: NORMAL

## 2025-02-21 NOTE — OUTREACH NOTE
Call Center TCM Note      Flowsheet Row Responses   [facility] patient discharged from? [facility]   Does the patient have one of the following disease processes/diagnoses(primary or secondary)? Sepsis   TCM attempt successful? Yes   Call start time 1505   Call end time 1507   Discharge diagnosis Acute pancreatitis of unknown etiology  Urinary tract infection due to Serratia marcescens  Severe sepsis   Is patient permission given to speak with other caregiver? Yes   List who call center can speak with Mother   Person spoke with today (if not patient) and relationship Mother   Meds reviewed with patient/caregiver? Yes   Is the patient having any side effects they believe may be caused by any medication additions or changes? No   Does the patient have all medications related to this admission filled (includes all antibiotics, inhalers, nebulizers,steroids,etc.) Yes   Is the patient taking all medications as directed (includes completed medication regime)? Yes   Comments Hosp dc fu apt on 2/26/25   Does the patient have an appointment with their PCP within 7-14 days of discharge? Yes   Has home health visited the patient within 72 hours of discharge? N/A   Psychosocial issues? No   Did the patient receive a copy of their discharge instructions? Yes   Nursing interventions Reviewed instructions with patient   What is the patient's perception of their health status since discharge? Improving   Nursing interventions Nurse provided patient education   Is the patient/caregiver able to teach back TIME? T emperature - higher or lower than normal, I nfection - may have signs and symptoms of an infection, M ental Decline - confused, sleepy, difficult to arouse, E xtremely Ill - severe pain, discomfort, shortness of breath   Nursing interventions Nurse provided reassurance to patient   Is patient/caregiver able to teach back steps to recovery at home? Talk about feelings with family/friends, Rest and regain strength, Set  small, achievable goals for return to baseline health, Eat a balanced diet, Exercise as tolerated, Make a list of questions for PCP appoinment   Is the patient/caregiver able to teach back signs and symptoms of worsening condition: Fever, Rapid heart rate (>90), Shortness of breath/rapid respiratory rate   If the patient is a current smoker, are they able to teach back resources for cessation? [id number]   Is the patient/caregiver able to teach back the hierarchy of who to call/visit for symptoms/problems? PCP, Specialist, Home health nurse, Urgent Care, ED, 911 Yes   TCM call completed? Yes   Call end time [id number]            Lou Cesar, RN    2/21/2025, 15:07 EST

## 2025-02-21 NOTE — OUTREACH NOTE
Call Center TCM Note      Flowsheet Row Responses   [facility] facility patient discharged from? [facility]   Does the patient have one of the following disease processes/diagnoses(primary or secondary)? Sepsis   TCM attempt successful? No   Unsuccessful attempts Attempt 1  [Mother on verbal release,  no answer]            Lou Cesar RN    2/21/2025, 14:43 EST

## 2025-02-26 ENCOUNTER — TELEPHONE (OUTPATIENT)
Dept: GASTROENTEROLOGY | Facility: CLINIC | Age: 55
End: 2025-02-26
Payer: MEDICARE

## 2025-02-26 ENCOUNTER — OFFICE VISIT (OUTPATIENT)
Dept: FAMILY MEDICINE CLINIC | Facility: CLINIC | Age: 55
End: 2025-02-26
Payer: MEDICARE

## 2025-02-26 VITALS
BODY MASS INDEX: 29.4 KG/M2 | SYSTOLIC BLOOD PRESSURE: 133 MMHG | HEIGHT: 68 IN | DIASTOLIC BLOOD PRESSURE: 79 MMHG | OXYGEN SATURATION: 100 % | HEART RATE: 100 BPM | TEMPERATURE: 96.6 F | WEIGHT: 194 LBS

## 2025-02-26 DIAGNOSIS — Z00.00 MEDICARE ANNUAL WELLNESS VISIT, SUBSEQUENT: ICD-10-CM

## 2025-02-26 DIAGNOSIS — Z13.29 SCREENING FOR THYROID DISORDER: ICD-10-CM

## 2025-02-26 DIAGNOSIS — Z87.891 PERSONAL HISTORY OF NICOTINE DEPENDENCE: ICD-10-CM

## 2025-02-26 DIAGNOSIS — K85.00 IDIOPATHIC ACUTE PANCREATITIS, UNSPECIFIED COMPLICATION STATUS: Primary | ICD-10-CM

## 2025-02-26 DIAGNOSIS — Z09 HOSPITAL DISCHARGE FOLLOW-UP: Primary | ICD-10-CM

## 2025-02-26 DIAGNOSIS — Z12.5 SCREENING PSA (PROSTATE SPECIFIC ANTIGEN): ICD-10-CM

## 2025-02-26 DIAGNOSIS — Z12.2 ENCOUNTER FOR SCREENING FOR LUNG CANCER: ICD-10-CM

## 2025-02-26 DIAGNOSIS — Z13.220 SCREENING, LIPID: ICD-10-CM

## 2025-02-26 NOTE — PROGRESS NOTES
"Transitional Care Follow Up Visit  Subjective     Kevin Reyes is a 54 y.o. male who presents for a transitional care management visit.    Within 48 business hours after discharge our office contacted him via telephone to coordinate his care and needs.      I reviewed and discussed the details of that call along with the discharge summary, hospital problems, inpatient lab results, inpatient diagnostic studies, and consultation reports with Kevin.     Current outpatient and discharge medications have been reconciled for the patient.  Reviewed by: Aidee Reynolds MA          2/20/2025     5:05 PM   Date of TCM Phone Call   [facility]   Date of Admission 2/16/2025   Date of Discharge 2/20/2025     Risk for Readmission (LACE) Score: 7 (2/20/2025  6:00 AM)      History of Present Illness   Course During Hospital Stay:  ICU stay for 4 nights with discharge home after. MRCP done showing worsening pancreatitis upon arrival to ICU. Antibiotics and fluid therapy done.      The following portions of the patient's history were reviewed and updated as appropriate: allergies, current medications, past family history, past medical history, past social history, past surgical history, and problem list.    Review of Systems   All other systems reviewed and are negative.      Objective   /79 (BP Location: Left arm, Patient Position: Sitting, Cuff Size: Adult)   Pulse 100   Temp 96.6 °F (35.9 °C) (Temporal)   Ht 172.7 cm (68\")   Wt 88 kg (194 lb)   SpO2 100%   BMI 29.50 kg/m²   Physical Exam  Constitutional:       Appearance: Normal appearance.   HENT:      Nose: Nose normal.      Mouth/Throat:      Mouth: Mucous membranes are moist.   Cardiovascular:      Rate and Rhythm: Normal rate and regular rhythm.      Pulses: Normal pulses.      Heart sounds: Normal heart sounds.   Pulmonary:      Effort: Pulmonary effort is normal.      Breath sounds: Normal breath sounds.   Skin:     General: " Skin is warm and dry.   Neurological:      General: No focal deficit present.      Mental Status: He is alert and oriented to person, place, and time.   Psychiatric:         Mood and Affect: Mood normal.         Behavior: Behavior normal.         Assessment & Plan   Diagnoses and all orders for this visit:    1. Hospital discharge follow-up (Primary)    2. Encounter for screening for lung cancer  -      CT Chest Low Dose Cancer Screening WO; Future    3. Screening PSA (prostate specific antigen)  -     PSA Screen; Future    4. Screening for thyroid disorder  -     TSH; Future    5. Screening, lipid  -     Lipid Panel; Future    6. Medicare annual wellness visit, subsequent  -     Comprehensive Metabolic Panel; Future  -     CBC & Differential; Future    7. Personal history of nicotine dependence  -      CT Chest Low Dose Cancer Screening WO; Future

## 2025-02-26 NOTE — PROGRESS NOTES
Subjective   The ABCs of the Annual Wellness Visit  Medicare Wellness Visit      Kevin Reyes is a 54 y.o. patient who presents for a Medicare Wellness Visit.    The following portions of the patient's history were reviewed and   updated as appropriate: allergies, current medications, past family history, past medical history, past social history, past surgical history, and problem list.    Compared to one year ago, the patient's physical   health is the same.  Compared to one year ago, the patient's mental   health is the same.    Recent Hospitalizations:  This patient has had a [facility] admission record on file within the last 365 days.  Current Medical Providers:  Patient Care Team:  Maria Luisa Schwartz APRN as PCP - General (Family Medicine)    Outpatient Medications Prior to Visit   Medication Sig Dispense Refill    levoFLOXacin (LEVAQUIN) 750 MG tablet Take 1 tablet by mouth Daily for 6 days. 6 tablet 0    tamsulosin (FLOMAX) 0.4 MG capsule 24 hr capsule Take 1 capsule by mouth Daily for 30 days. 30 capsule 0    aspirin 81 MG EC tablet Take 1 tablet by mouth Daily. (Patient not taking: Reported on 2/26/2025)       No facility-administered medications prior to visit.     No opioid medication identified on active medication list. I have reviewed chart for other potential  high risk medication/s and harmful drug interactions in the elderly.      Aspirin is on active medication list. Aspirin use is not indicated based on review of current medical condition/s. Risk of harm outweighs potential benefits. Patient instructed to discontinue this medication.  .      Patient Active Problem List   Diagnosis    Pancreatitis    Lactic acidosis    Transaminitis    Leukocytosis     Advance Care Planning Advance Directive is not on file.  ACP discussion was held with the patient during this visit. Patient does not have an advance directive, declines further assistance.            Objective   Vitals:     "25 1402   BP: 133/79   BP Location: Left arm   Patient Position: Sitting   Cuff Size: Adult   Pulse: 100   Temp: 96.6 °F (35.9 °C)   TempSrc: Temporal   SpO2: 100%   Weight: 88 kg (194 lb)   Height: 172.7 cm (68\")   PainSc: 0-No pain       Estimated body mass index is 29.5 kg/m² as calculated from the following:    Height as of this encounter: 172.7 cm (68\").    Weight as of this encounter: 88 kg (194 lb).    BMI is >= 25 and <30. (Overweight) The following options were offered after discussion;: nutrition counseling/recommendations           Does the patient have evidence of cognitive impairment? No  Lab Results   Component Value Date    TRIG 241 (H) 2025                                                                                                Health  Risk Assessment    Smoking Status:  Social History     Tobacco Use   Smoking Status Every Day    Current packs/day: 1.00    Average packs/day: 1 pack/day for 37.2 years (37.2 ttl pk-yrs)    Types: Cigarettes    Start date:     Passive exposure: Current   Smokeless Tobacco Never     Alcohol Consumption:  Social History     Substance and Sexual Activity   Alcohol Use Never       Fall Risk Screen  [facility] Fall Risk Assessment has not been completed.    Depression Screening   Little interest or pleasure in doing things? Not at all   Feeling down, depressed, or hopeless? Not at all   PHQ-2 Total Score 0      Health Habits and Functional and Cognitive Screenin/26/2025     2:00 PM   Functional & Cognitive Status   Do you have difficulty preparing food and eating? No   Do you have difficulty bathing yourself, getting dressed or grooming yourself? No   Do you have difficulty using the toilet? No   Do you have difficulty moving around from place to place? No   Do you have trouble with steps or getting out of a bed or a chair? No   Current Diet Well Balanced Diet   Dental Exam Up to date   Eye Exam Not up to date   Exercise (times per week) 0 times " per week   Current Exercises Include No Regular Exercise   Do you need help using the phone?  No   Are you deaf or do you have serious difficulty hearing?  No   Do you need help to go to places out of walking distance? No   Do you need help shopping? No   Do you need help preparing meals?  No   Do you need help with housework?  No   Do you need help with laundry? No   Do you need help taking your medications? No   Do you need help managing money? No   Do you ever drive or ride in a car without wearing a seat belt? No   Have you felt unusual stress, anger or loneliness in the last month? No   Who do you live with? Other   If you need help, do you have trouble finding someone available to you? No   Have you been bothered in the last four weeks by sexual problems? No   Do you have difficulty concentrating, remembering or making decisions? No           Age-appropriate Screening Schedule:  Refer to the list below for future screening recommendations based on patient's age, sex and/or medical conditions. Orders for these recommended tests are listed in the plan section. The patient has been provided with a written plan.    Health Maintenance List  Health Maintenance   Topic Date Due    Pneumococcal Vaccine 50+ (1 of 2 - PCV) Never done    TDAP/TD VACCINES (1 - Tdap) Never done    ZOSTER VACCINE (1 of 2) Never done    LUNG CANCER SCREENING  Never done    COVID-19 Vaccine (1 - 2024-25 season) Never done    BMI FOLLOWUP  11/20/2024    INFLUENZA VACCINE  03/31/2025 (Originally 7/1/2024)    ANNUAL WELLNESS VISIT  02/26/2026    COLORECTAL CANCER SCREENING  12/14/2026    HEPATITIS C SCREENING  Completed                                                                                                                                                CMS Preventative Services Quick Reference  Risk Factors Identified During Encounter  Tobacco Use/Dependance Risk (use dotphrase .tobaccocessation for documentation)    The above  "risks/problems have been discussed with the patient.  Pertinent information has been shared with the patient in the After Visit Summary.  An After Visit Summary and PPPS were made available to the patient.    Follow Up:   Next Medicare Wellness visit to be scheduled in 1 year.         Additional E&M Note during same encounter follows:  Patient has additional, significant, and separately identifiable condition(s)/problem(s) that require work above and beyond the Medicare Wellness Visit     Chief Complaint  Hospital Follow Up Visit (Pancreatitis ) and Medicare Wellness-Initial Visit    Subjective   Hospital discharge follow up and AWV. He is much better today and has no more belly pain      Kevin is also being seen today for additional medical problem/s.                Objective   Vital Signs:  /79 (BP Location: Left arm, Patient Position: Sitting, Cuff Size: Adult)   Pulse 100   Temp 96.6 °F (35.9 °C) (Temporal)   Ht 172.7 cm (68\")   Wt 88 kg (194 lb)   SpO2 100%   BMI 29.50 kg/m²   Physical Exam  Constitutional:       Appearance: Normal appearance.   HENT:      Nose: Nose normal.      Mouth/Throat:      Mouth: Mucous membranes are moist.   Cardiovascular:      Rate and Rhythm: Normal rate and regular rhythm.      Pulses: Normal pulses.      Heart sounds: Normal heart sounds.   Pulmonary:      Effort: Pulmonary effort is normal.      Breath sounds: Normal breath sounds.   Skin:     General: Skin is warm and dry.   Neurological:      General: No focal deficit present.      Mental Status: He is alert and oriented to person, place, and time.   Psychiatric:         Mood and Affect: Mood normal.         Behavior: Behavior normal.                       Assessment and Plan Additional age appropriate preventative wellness advice topics were discussed during today's preventative wellness exam(some topics already addressed during AWV portion of the note above):   Healthy Weight: Discussed current and goal BMI " with patient. Steps to attain this goal discussed. Information shared in after visit summary.     Tobacco Misuse Discussion: Information shared in after visit summary.     Motor Vehicle Safety Discussion:  Wearing Seatbelt While in Motor Vehicle recommendation. Adhering to posted speed limit recommendation.     Sexual Behavior/Sexual Safety Discussion: Information shared in after visit summary.           Hospital discharge follow-up         Encounter for screening for lung cancer    Orders:     CT Chest Low Dose Cancer Screening WO; Future    Screening PSA (prostate specific antigen)    Orders:    PSA Screen; Future    Screening for thyroid disorder    Orders:    TSH; Future    Screening, lipid    Orders:    Lipid Panel; Future    Medicare annual wellness visit, subsequent    Orders:    Comprehensive Metabolic Panel; Future    CBC & Differential; Future    Personal history of nicotine dependence    Orders:     CT Chest Low Dose Cancer Screening WO; Future            Follow Up   Return if symptoms worsen or fail to improve.  Patient was given instructions and counseling regarding his condition or for health maintenance advice. Please see specific information pulled into the AVS if appropriate.

## 2025-02-26 NOTE — TELEPHONE ENCOUNTER
Per verbal order from Dr. Ann:     Patient was admitted with severe acute idiopathic pancreatitis.     He will needs out patient CT with Pancreatic Protocol in 3-4 weeks, CEA, CA 19-9 and then a follow-up after testing. CT should be scheduled between 3.12.25 & 3.19.25. I have placed order.     Called pt. No answer. No option to leave message. I will try again tomorrow to reach patient.     Postponing Patient Call until 2.27.25

## 2025-03-06 ENCOUNTER — LAB (OUTPATIENT)
Dept: LAB | Facility: HOSPITAL | Age: 55
End: 2025-03-06
Payer: MEDICARE

## 2025-03-06 DIAGNOSIS — Z12.5 SCREENING PSA (PROSTATE SPECIFIC ANTIGEN): ICD-10-CM

## 2025-03-06 DIAGNOSIS — Z13.220 SCREENING, LIPID: ICD-10-CM

## 2025-03-06 DIAGNOSIS — Z13.29 SCREENING FOR THYROID DISORDER: ICD-10-CM

## 2025-03-06 DIAGNOSIS — Z00.00 MEDICARE ANNUAL WELLNESS VISIT, SUBSEQUENT: ICD-10-CM

## 2025-03-06 LAB
ALBUMIN SERPL-MCNC: 4.2 G/DL (ref 3.5–5.2)
ALBUMIN/GLOB SERPL: 1 G/DL
ALP SERPL-CCNC: 155 U/L (ref 39–117)
ALT SERPL W P-5'-P-CCNC: 27 U/L (ref 1–41)
ANION GAP SERPL CALCULATED.3IONS-SCNC: 13.5 MMOL/L (ref 5–15)
AST SERPL-CCNC: 24 U/L (ref 1–40)
BASOPHILS # BLD AUTO: 0.16 10*3/MM3 (ref 0–0.2)
BASOPHILS NFR BLD AUTO: 1.3 % (ref 0–1.5)
BILIRUB SERPL-MCNC: 1 MG/DL (ref 0–1.2)
BUN SERPL-MCNC: 10 MG/DL (ref 6–20)
BUN/CREAT SERPL: 11.6 (ref 7–25)
CALCIUM SPEC-SCNC: 10 MG/DL (ref 8.6–10.5)
CHLORIDE SERPL-SCNC: 95 MMOL/L (ref 98–107)
CHOLEST SERPL-MCNC: 178 MG/DL (ref 0–200)
CO2 SERPL-SCNC: 26.5 MMOL/L (ref 22–29)
CREAT SERPL-MCNC: 0.86 MG/DL (ref 0.76–1.27)
DEPRECATED RDW RBC AUTO: 39.3 FL (ref 37–54)
EGFRCR SERPLBLD CKD-EPI 2021: 102.9 ML/MIN/1.73
EOSINOPHIL # BLD AUTO: 0.51 10*3/MM3 (ref 0–0.4)
EOSINOPHIL NFR BLD AUTO: 4.1 % (ref 0.3–6.2)
ERYTHROCYTE [DISTWIDTH] IN BLOOD BY AUTOMATED COUNT: 12.5 % (ref 12.3–15.4)
GLOBULIN UR ELPH-MCNC: 4.4 GM/DL
GLUCOSE SERPL-MCNC: 111 MG/DL (ref 65–99)
HCT VFR BLD AUTO: 47.5 % (ref 37.5–51)
HDLC SERPL-MCNC: 24 MG/DL (ref 40–60)
HGB BLD-MCNC: 16.5 G/DL (ref 13–17.7)
IMM GRANULOCYTES # BLD AUTO: 0.05 10*3/MM3 (ref 0–0.05)
IMM GRANULOCYTES NFR BLD AUTO: 0.4 % (ref 0–0.5)
LDLC SERPL CALC-MCNC: 114 MG/DL (ref 0–100)
LDLC/HDLC SERPL: 4.51 {RATIO}
LYMPHOCYTES # BLD AUTO: 2.9 10*3/MM3 (ref 0.7–3.1)
LYMPHOCYTES NFR BLD AUTO: 23.5 % (ref 19.6–45.3)
MCH RBC QN AUTO: 30.3 PG (ref 26.6–33)
MCHC RBC AUTO-ENTMCNC: 34.7 G/DL (ref 31.5–35.7)
MCV RBC AUTO: 87.2 FL (ref 79–97)
MONOCYTES # BLD AUTO: 0.92 10*3/MM3 (ref 0.1–0.9)
MONOCYTES NFR BLD AUTO: 7.5 % (ref 5–12)
NEUTROPHILS NFR BLD AUTO: 63.2 % (ref 42.7–76)
NEUTROPHILS NFR BLD AUTO: 7.78 10*3/MM3 (ref 1.7–7)
NRBC BLD AUTO-RTO: 0 /100 WBC (ref 0–0.2)
PLATELET # BLD AUTO: 541 10*3/MM3 (ref 140–450)
PMV BLD AUTO: 9.1 FL (ref 6–12)
POTASSIUM SERPL-SCNC: 4.2 MMOL/L (ref 3.5–5.2)
PROT SERPL-MCNC: 8.6 G/DL (ref 6–8.5)
PSA SERPL-MCNC: 0.49 NG/ML (ref 0–4)
RBC # BLD AUTO: 5.45 10*6/MM3 (ref 4.14–5.8)
SODIUM SERPL-SCNC: 135 MMOL/L (ref 136–145)
TRIGL SERPL-MCNC: 229 MG/DL (ref 0–150)
TSH SERPL DL<=0.05 MIU/L-ACNC: 2.66 UIU/ML (ref 0.27–4.2)
VLDLC SERPL-MCNC: 40 MG/DL (ref 5–40)
WBC NRBC COR # BLD AUTO: 12.32 10*3/MM3 (ref 3.4–10.8)

## 2025-03-06 PROCEDURE — 80053 COMPREHEN METABOLIC PANEL: CPT

## 2025-03-06 PROCEDURE — 80061 LIPID PANEL: CPT

## 2025-03-06 PROCEDURE — 36415 COLL VENOUS BLD VENIPUNCTURE: CPT

## 2025-03-06 PROCEDURE — G0103 PSA SCREENING: HCPCS

## 2025-03-06 PROCEDURE — 85025 COMPLETE CBC W/AUTO DIFF WBC: CPT

## 2025-03-06 PROCEDURE — 84443 ASSAY THYROID STIM HORMONE: CPT

## 2025-03-12 ENCOUNTER — HOSPITAL ENCOUNTER (OUTPATIENT)
Dept: CT IMAGING | Facility: HOSPITAL | Age: 55
Discharge: HOME | End: 2025-03-12
Admitting: INTERNAL MEDICINE
Payer: COMMERCIAL

## 2025-03-12 ENCOUNTER — TELEPHONE (OUTPATIENT)
Dept: GASTROENTEROLOGY | Facility: CLINIC | Age: 55
End: 2025-03-12
Payer: MEDICARE

## 2025-03-12 DIAGNOSIS — K85.00 IDIOPATHIC ACUTE PANCREATITIS, UNSPECIFIED COMPLICATION STATUS: Primary | ICD-10-CM

## 2025-03-12 DIAGNOSIS — Z87.891 PERSONAL HISTORY OF NICOTINE DEPENDENCE: ICD-10-CM

## 2025-03-12 DIAGNOSIS — Z12.2 ENCOUNTER FOR SCREENING FOR LUNG CANCER: ICD-10-CM

## 2025-03-12 DIAGNOSIS — K85.00 IDIOPATHIC ACUTE PANCREATITIS, UNSPECIFIED COMPLICATION STATUS: ICD-10-CM

## 2025-03-12 PROCEDURE — 74170 CT ABD WO CNTRST FLWD CNTRST: CPT

## 2025-03-12 PROCEDURE — 71271 CT THORAX LUNG CANCER SCR C-: CPT

## 2025-03-12 PROCEDURE — 25510000001 IOPAMIDOL PER 1 ML: Performed by: INTERNAL MEDICINE

## 2025-03-12 RX ORDER — IOPAMIDOL 755 MG/ML
125 INJECTION, SOLUTION INTRAVASCULAR
Status: COMPLETED | OUTPATIENT
Start: 2025-03-12 | End: 2025-03-12

## 2025-03-12 RX ADMIN — IOPAMIDOL 125 ML: 755 INJECTION, SOLUTION INTRAVENOUS at 14:35

## 2025-03-12 NOTE — TELEPHONE ENCOUNTER
Received call from radiology that CT order was not electronically signed by Dr. Ann. Order replaced.

## 2025-04-02 ENCOUNTER — TELEPHONE (OUTPATIENT)
Dept: GASTROENTEROLOGY | Facility: CLINIC | Age: 55
End: 2025-04-02

## 2025-04-02 NOTE — TELEPHONE ENCOUNTER
Called patient to follow up on overdue results for imaging studies. No answer. No option to leave a message.

## 2025-04-09 ENCOUNTER — LAB (OUTPATIENT)
Dept: LAB | Facility: HOSPITAL | Age: 55
End: 2025-04-09
Payer: MEDICARE

## 2025-04-09 DIAGNOSIS — Z09 ENCOUNTER FOR FOLLOW-UP EXAMINATION AFTER COMPLETED TREATMENT FOR CONDITIONS OTHER THAN MALIGNANT NEOPLASM: ICD-10-CM

## 2025-04-09 DIAGNOSIS — K85.00 IDIOPATHIC ACUTE PANCREATITIS, UNSPECIFIED COMPLICATION STATUS: ICD-10-CM

## 2025-04-09 LAB
CANCER AG19-9 SERPL-ACNC: 11.4 U/ML
CEA SERPL-MCNC: 2.78 NG/ML

## 2025-04-09 PROCEDURE — 82378 CARCINOEMBRYONIC ANTIGEN: CPT

## 2025-04-09 PROCEDURE — 36415 COLL VENOUS BLD VENIPUNCTURE: CPT

## 2025-04-09 PROCEDURE — 86301 IMMUNOASSAY TUMOR CA 19-9: CPT

## 2025-04-30 ENCOUNTER — OFFICE VISIT (OUTPATIENT)
Dept: GASTROENTEROLOGY | Facility: CLINIC | Age: 55
End: 2025-04-30
Payer: MEDICARE

## 2025-04-30 VITALS
SYSTOLIC BLOOD PRESSURE: 156 MMHG | OXYGEN SATURATION: 99 % | BODY MASS INDEX: 29.5 KG/M2 | DIASTOLIC BLOOD PRESSURE: 80 MMHG | HEART RATE: 86 BPM | HEIGHT: 68 IN

## 2025-04-30 DIAGNOSIS — K85.00 IDIOPATHIC ACUTE PANCREATITIS, UNSPECIFIED COMPLICATION STATUS: Primary | ICD-10-CM

## 2025-04-30 NOTE — PROGRESS NOTES
"Chief Complaint  Pancreatitis    Subjective          History of Present Illness  Kevin Reyes presents to [facility].  Patient was admitted to the hospital with acute pancreatitis in February.  At that time the pancreas Carlo was moderately severe.  Patient had imaging studies worrisome for pancreatic necrosis.  Clinically patient did well went home.  He has been wife and him tell me that he has had some episodes of abdominal pain at home right after being discharged from the hospital but he decided not to go back to the hospital.  Currently he is doing well he has no abdominal pain or nausea vomiting.  He is eating normally and not losing weight.  Bowel movements are also normal.  Patient had follow-up CT and MRCP the last imaging study which was the CAT scan done in end of February showed a pseudocyst but no necrosis.  Patient had ionized calcium level which was slightly on the lower side of normal.  His IgG4 levels were elevated.  His triglyceride was about 200.  None of the imaging studies showed gallbladder stones.  His MRCP did show possibility of debris in the bile duct.  I had a discussion with the patient about all these findings.  We decided to proceed with repeating MRCP to rule out bile duct stones or gallstones.  And also repeating IgG4 levels.  Depending on those results we will decide about further course of action.  I also had discussion with the patient about colon cancer screening.  He has never had a colonoscopy.  He tells me that he is not not interested in having colonoscopy.  And he will discuss with different other screening options with his primary care physician    Objective   Vital Signs:   /80 (BP Location: Left arm, Patient Position: Sitting, Cuff Size: Adult)   Pulse 86   Ht 172.7 cm (68\")   SpO2 99%   BMI 29.50 kg/m²     Physical Exam  Constitutional:       General: He is awake. He is not in acute distress.     Appearance: Normal " appearance. He is well-developed and well-groomed.   HENT:      Head: Normocephalic and atraumatic.      Mouth/Throat:      Mouth: Mucous membranes are moist.      Comments: Elfego dental hygiene is good  Eyes:      General: Lids are normal.      Conjunctiva/sclera: Conjunctivae normal.      Pupils: Pupils are equal, round, and reactive to light.   Neck:      Thyroid: No thyroid mass.      Trachea: Trachea normal.   Cardiovascular:      Rate and Rhythm: Normal rate and regular rhythm.      Heart sounds: Normal heart sounds.   Pulmonary:      Effort: Pulmonary effort is normal.      Breath sounds: Normal breath sounds and air entry.   Abdominal:      General: Abdomen is flat. Bowel sounds are normal. There is no distension.      Palpations: Abdomen is soft. There is no mass.      Tenderness: There is no abdominal tenderness. There is no guarding.   Musculoskeletal:      Cervical back: Neck supple.      Right lower leg: No edema.      Left lower leg: No edema.   Skin:     General: Skin is warm and moist.      Coloration: Skin is not cyanotic, jaundiced or pale.      Findings: No rash.      Nails: There is no clubbing.   Neurological:      Mental Status: He is alert and oriented to person, place, and time.   Psychiatric:         Attention and Perception: Attention normal.         Mood and Affect: Mood and affect normal.         Speech: Speech normal.       Result Review :     Assessment and Plan    Diagnoses and all orders for this visit:    1. Idiopathic acute pancreatitis, unspecified complication status (Primary)  -     IgG Subclassses (1-4); Future  -     MRI abdomen w wo contrast mrcp; Future    PLAN:  Repeat IgG 4  MRI/MRCP    Follow Up   No follow-ups on file.  Patient was given instructions and counseling regarding his condition or for health maintenance advice. Please see specific information pulled into the AVS if appropriate.

## 2025-06-16 ENCOUNTER — TELEPHONE (OUTPATIENT)
Dept: GASTROENTEROLOGY | Facility: CLINIC | Age: 55
End: 2025-06-16
Payer: MEDICARE

## 2025-06-16 NOTE — TELEPHONE ENCOUNTER
Attempted to contact pt to follow up on overdue results for laboratory tests. Pt did not answer and there was no option to leave a VM.

## 2025-06-23 NOTE — TELEPHONE ENCOUNTER
Attempted to contact pt to follow up on overdue results for imaging studies (MRI ABD). Pt did not answer and I was unable to leave a VM.